# Patient Record
Sex: FEMALE | Race: WHITE | NOT HISPANIC OR LATINO | Employment: FULL TIME | URBAN - METROPOLITAN AREA
[De-identification: names, ages, dates, MRNs, and addresses within clinical notes are randomized per-mention and may not be internally consistent; named-entity substitution may affect disease eponyms.]

---

## 2020-12-28 ENCOUNTER — IMMUNIZATIONS (OUTPATIENT)
Dept: FAMILY MEDICINE CLINIC | Facility: HOSPITAL | Age: 45
End: 2020-12-28
Payer: COMMERCIAL

## 2020-12-28 DIAGNOSIS — Z23 ENCOUNTER FOR IMMUNIZATION: ICD-10-CM

## 2020-12-28 PROCEDURE — 0011A SARS-COV-2 / COVID-19 MRNA VACCINE (MODERNA) 100 MCG: CPT

## 2020-12-28 PROCEDURE — 91301 SARS-COV-2 / COVID-19 MRNA VACCINE (MODERNA) 100 MCG: CPT

## 2021-01-26 ENCOUNTER — IMMUNIZATIONS (OUTPATIENT)
Dept: FAMILY MEDICINE CLINIC | Facility: HOSPITAL | Age: 46
End: 2021-01-26

## 2021-01-26 DIAGNOSIS — Z23 ENCOUNTER FOR IMMUNIZATION: Primary | ICD-10-CM

## 2021-01-26 PROCEDURE — 91301 SARS-COV-2 / COVID-19 MRNA VACCINE (MODERNA) 100 MCG: CPT

## 2021-01-26 PROCEDURE — 0012A SARS-COV-2 / COVID-19 MRNA VACCINE (MODERNA) 100 MCG: CPT

## 2021-09-21 ENCOUNTER — APPOINTMENT (OUTPATIENT)
Dept: URGENT CARE | Facility: CLINIC | Age: 46
End: 2021-09-21

## 2021-09-21 DIAGNOSIS — Z02.1 PHYSICAL EXAM, PRE-EMPLOYMENT: Primary | ICD-10-CM

## 2021-09-21 LAB — RUBV IGG SERPL IA-ACNC: >175 IU/ML

## 2021-09-21 PROCEDURE — 86480 TB TEST CELL IMMUN MEASURE: CPT

## 2021-09-21 PROCEDURE — 86762 RUBELLA ANTIBODY: CPT

## 2021-09-21 PROCEDURE — 86735 MUMPS ANTIBODY: CPT

## 2021-09-21 PROCEDURE — 86765 RUBEOLA ANTIBODY: CPT

## 2021-09-21 PROCEDURE — 86787 VARICELLA-ZOSTER ANTIBODY: CPT

## 2021-09-23 LAB
GAMMA INTERFERON BACKGROUND BLD IA-ACNC: 0.03 IU/ML
M TB IFN-G BLD-IMP: NEGATIVE
M TB IFN-G CD4+ BCKGRND COR BLD-ACNC: 0 IU/ML
M TB IFN-G CD4+ BCKGRND COR BLD-ACNC: 0 IU/ML
MEV IGG SER QL: NORMAL
MITOGEN IGNF BCKGRD COR BLD-ACNC: >10 IU/ML
MUV IGG SER QL: NORMAL
VZV IGG SER IA-ACNC: NORMAL

## 2023-03-12 ENCOUNTER — OFFICE VISIT (OUTPATIENT)
Dept: URGENT CARE | Facility: CLINIC | Age: 48
End: 2023-03-12

## 2023-03-12 VITALS
HEART RATE: 75 BPM | TEMPERATURE: 97.3 F | SYSTOLIC BLOOD PRESSURE: 117 MMHG | OXYGEN SATURATION: 98 % | DIASTOLIC BLOOD PRESSURE: 67 MMHG

## 2023-03-12 DIAGNOSIS — J02.9 SORE THROAT: ICD-10-CM

## 2023-03-12 DIAGNOSIS — J02.0 STREP PHARYNGITIS: Primary | ICD-10-CM

## 2023-03-12 LAB — S PYO AG THROAT QL: POSITIVE

## 2023-03-12 RX ORDER — ESOMEPRAZOLE MAGNESIUM 40 MG/1
40 CAPSULE, DELAYED RELEASE ORAL
COMMUNITY

## 2023-03-12 RX ORDER — AMOXICILLIN 500 MG/1
500 CAPSULE ORAL EVERY 12 HOURS SCHEDULED
Qty: 20 CAPSULE | Refills: 0 | Status: SHIPPED | OUTPATIENT
Start: 2023-03-12 | End: 2023-03-22

## 2023-03-12 RX ORDER — FEXOFENADINE HCL 180 MG/1
TABLET ORAL
COMMUNITY

## 2023-03-12 RX ORDER — HYDROCHLOROTHIAZIDE 25 MG/1
TABLET ORAL
COMMUNITY
Start: 2023-03-02

## 2023-03-12 RX ORDER — ATORVASTATIN CALCIUM 20 MG/1
TABLET, FILM COATED ORAL
COMMUNITY
Start: 2023-03-02

## 2023-03-12 RX ORDER — ASPIRIN 81 MG/1
TABLET ORAL
COMMUNITY

## 2023-03-12 RX ORDER — MULTIVITAMIN
TABLET ORAL
COMMUNITY

## 2023-03-12 RX ORDER — LISINOPRIL 10 MG/1
TABLET ORAL
COMMUNITY
Start: 2022-12-05

## 2023-03-12 NOTE — PROGRESS NOTES
[facility]        NAME: Home Haynes is a 50 y o  female  : 1975    MRN: [id number]  DATE: 2023  TIME: 7:26 PM    Assessment and Plan   Strep pharyngitis [J02 0]  1  Strep pharyngitis  amoxicillin (AMOXIL) 500 mg capsule      2  Sore throat  POCT rapid strepA            Patient Instructions       Follow up with PCP in 3-5 days  Proceed to  ER if symptoms worsen  Chief Complaint     Chief Complaint   Patient presents with   • Sore Throat     Pt reports more than a week of having sore throat with white spots in the back of throat as well  Pt also reports right tonsil swelling  History of Present Illness       47-year-old female presenting with 5-day history of sore throat and painful swallowing  Review of Systems   Review of Systems   Constitutional: Negative  HENT: Positive for sore throat  Eyes: Negative  Respiratory: Negative  Cardiovascular: Negative  Gastrointestinal: Negative  Genitourinary: Negative  Musculoskeletal: Positive for arthralgias and myalgias  Skin: Negative  Allergic/Immunologic: Negative  Neurological: Negative  Hematological: Negative  Psychiatric/Behavioral: Negative            Current Medications       Current Outpatient Medications:   •  amoxicillin (AMOXIL) 500 mg capsule, Take 1 capsule (500 mg total) by mouth every 12 (twelve) hours for 10 days, Disp: 20 capsule, Rfl: 0  •  aspirin (ECOTRIN LOW STRENGTH) 81 mg EC tablet, Take by mouth, Disp: , Rfl:   •  atorvastatin (LIPITOR) 20 mg tablet, Take by mouth, Disp: , Rfl:   •  Empagliflozin (JARDIANCE) 10 MG TABS tablet, Take by mouth, Disp: , Rfl:   •  esomeprazole (NexIUM) 40 MG capsule, Take 40 mg by mouth, Disp: , Rfl:   •  fexofenadine (ALLEGRA) 180 MG tablet, Take by mouth, Disp: , Rfl:   •  hydrochlorothiazide (HYDRODIURIL) 25 mg tablet, Take by mouth, Disp: , Rfl:   •  lisinopril (ZESTRIL) 10 mg tablet, Take by mouth, Disp: , Rfl:   •  metFORMIN (GLUCOPHAGE) 1000 MG tablet, Take by mouth, Disp: , Rfl:   •  Multiple Vitamin tablet, Take by mouth, Disp: , Rfl:   •  Turmeric 1053 MG TABS, Take by mouth, Disp: , Rfl:     Current Allergies     Allergies as of 03/12/2023 - Reviewed 03/12/2023   Allergen Reaction Noted   • [place] (diagnostic) - food allergy Hives 03/12/2023   • Codeine Vomiting 02/19/2020   • Morphine Other (See Comments) and Vomiting 05/18/2022   • Oxycodone-acetaminophen Vomiting 02/19/2020            The following portions of the patient's history were reviewed and updated as appropriate: allergies, current medications, past family history, past medical history, past social history, past surgical history and problem list      No past medical history on file  No past surgical history on file  No family history on file  Medications have been verified  Objective   /67 (BP Location: Right arm, Patient Position: Sitting, Cuff Size: Large)   Pulse 75   Temp (!) 97 3 °F (36 3 °C) (Tympanic)   SpO2 98%   No LMP recorded  Physical Exam     Physical Exam  Vitals and nursing note reviewed  Constitutional:       Appearance: She is well-developed  HENT:      Head: Normocephalic  Mouth/Throat:      Pharynx: Pharyngeal swelling, oropharyngeal exudate and posterior oropharyngeal erythema present  Tonsils: Tonsillar exudate present  Eyes:      Pupils: Pupils are equal, round, and reactive to light  Cardiovascular:      Rate and Rhythm: Normal rate and regular rhythm  Pulmonary:      Effort: Pulmonary effort is normal    Musculoskeletal:         General: Normal range of motion  Skin:     General: Skin is warm and dry  Neurological:      Mental Status: She is alert and oriented to person, place, and time

## 2023-05-08 ENCOUNTER — OFFICE VISIT (OUTPATIENT)
Dept: GYNECOLOGY | Facility: CLINIC | Age: 48
End: 2023-05-08

## 2023-05-08 VITALS
DIASTOLIC BLOOD PRESSURE: 70 MMHG | WEIGHT: 293 LBS | SYSTOLIC BLOOD PRESSURE: 118 MMHG | BODY MASS INDEX: 50.02 KG/M2 | HEIGHT: 64 IN

## 2023-05-08 DIAGNOSIS — Z11.3 SCREEN FOR STD (SEXUALLY TRANSMITTED DISEASE): Primary | ICD-10-CM

## 2023-05-08 DIAGNOSIS — Z11.51 SCREENING FOR HUMAN PAPILLOMAVIRUS (HPV): ICD-10-CM

## 2023-05-08 DIAGNOSIS — Z12.31 ENCOUNTER FOR SCREENING MAMMOGRAM FOR BREAST CANCER: ICD-10-CM

## 2023-05-08 DIAGNOSIS — Z01.419 ENCOUNTER FOR ANNUAL ROUTINE GYNECOLOGICAL EXAMINATION: ICD-10-CM

## 2023-05-08 RX ORDER — VIT C/B6/B5/MAGNESIUM/HERB 173 50-5-6-5MG
CAPSULE ORAL
COMMUNITY

## 2023-05-08 NOTE — PROGRESS NOTES
Assessment/Plan:    pap and HPV done today    GC and chlamydia done as she has never had this before    mammogram reviewed with her including breast density  RX given and she will schedule  Discussed self breast exams    colon cancer screening-negative Cologuard in 2022    Midcycle spotting-this may be from ovulation  She will schedule sonohysterogram to rule out a polyp  She will keep track of her cycles  discussed preventive care, regular exercise and a healthy diet      No problem-specific Assessment & Plan notes found for this encounter  Diagnoses and all orders for this visit:    Screen for STD (sexually transmitted disease)  -     Chlamydia/GC amplified DNA by PCR    Encounter for annual routine gynecological examination  -     Liquid-based pap, screening    Encounter for screening mammogram for breast cancer  -     Mammo screening bilateral w 3d & cad; Future    Screening for human papillomavirus (HPV)  -     Liquid-based pap, screening    Other orders  -     Multiple Vitamin (MULTIVITAMIN ADULT PO); Take by mouth  -     Turmeric 500 MG CAPS; Take by mouth          Subjective:      Patient ID: Diya Kramer is a 50 y o  female  New Pt - 50year old female presents for yearly  She has not had a GYN exam in many years  Overall, her cycles have been regular but last month she skipped her cycle for the first time  She has spotting for 1 or 2 days that occurs 1 week after her cycle ends  This has been occurring for the last year  It seems to be happening midcycle  She has some mild cramps although these are unchanged  She has been having hot flashes both during the day and at night for about a year  She has never had a mammogram     She did have a normal Pap in   She does not require contraception as she is in a same sex marriage      Mother had leukemia in her 76s, father  at 43 had blood clot/MI  She has never been pregnant          The following portions of the patient's history were reviewed and updated as appropriate: allergies, current medications, past family history, past medical history, past social history, past surgical history and problem list     Review of Systems   Constitutional: Negative  Gastrointestinal: Negative  Genitourinary: Negative  Objective: There were no vitals taken for this visit  Physical Exam  Vitals reviewed  Constitutional:       Appearance: She is well-developed  Neck:      Thyroid: No thyromegaly  Trachea: No tracheal deviation  Cardiovascular:      Rate and Rhythm: Normal rate and regular rhythm  Pulmonary:      Effort: Pulmonary effort is normal       Breath sounds: Normal breath sounds  Chest:   Breasts:     Breasts are symmetrical       Right: No inverted nipple, mass, nipple discharge, skin change or tenderness  Left: No inverted nipple, mass, nipple discharge, skin change or tenderness  Abdominal:      General: There is no distension  Palpations: Abdomen is soft  There is no mass  Tenderness: There is no abdominal tenderness  Genitourinary:     Labia:         Right: No rash, tenderness, lesion or injury  Left: No rash, tenderness, lesion or injury  Vagina: Normal       Cervix: No cervical motion tenderness, discharge or friability  Adnexa:         Right: No mass, tenderness or fullness  Left: No mass, tenderness or fullness          Comments: Patient declines rectal  Exam limited by body habitus  Cervix very high

## 2023-05-10 LAB
C TRACH DNA SPEC QL NAA+PROBE: NEGATIVE
HPV HR 12 DNA CVX QL NAA+PROBE: NEGATIVE
HPV16 DNA CVX QL NAA+PROBE: NEGATIVE
HPV18 DNA CVX QL NAA+PROBE: NEGATIVE
N GONORRHOEA DNA SPEC QL NAA+PROBE: NEGATIVE

## 2023-05-11 LAB
LAB AP GYN PRIMARY INTERPRETATION: NORMAL
Lab: NORMAL

## 2023-06-10 ENCOUNTER — HOSPITAL ENCOUNTER (OUTPATIENT)
Dept: MAMMOGRAPHY | Facility: CLINIC | Age: 48
Discharge: HOME | End: 2023-06-10
Payer: COMMERCIAL

## 2023-06-10 VITALS — HEIGHT: 63 IN | BODY MASS INDEX: 51.91 KG/M2 | WEIGHT: 293 LBS

## 2023-06-10 DIAGNOSIS — Z12.31 ENCOUNTER FOR SCREENING MAMMOGRAM FOR BREAST CANCER: ICD-10-CM

## 2023-06-10 DIAGNOSIS — Z12.31 VISIT FOR SCREENING MAMMOGRAM: ICD-10-CM

## 2023-06-10 PROCEDURE — 77067 SCR MAMMO BI INCL CAD: CPT

## 2023-06-10 PROCEDURE — 77063 BREAST TOMOSYNTHESIS BI: CPT

## 2023-07-17 ENCOUNTER — HOSPITAL ENCOUNTER (OUTPATIENT)
Dept: ULTRASOUND IMAGING | Facility: CLINIC | Age: 48
Discharge: HOME | End: 2023-07-17
Payer: COMMERCIAL

## 2023-07-17 DIAGNOSIS — R92.8 ABNORMAL MAMMOGRAM: ICD-10-CM

## 2023-07-17 PROCEDURE — 76642 ULTRASOUND BREAST LIMITED: CPT

## 2024-01-18 ENCOUNTER — HOSPITAL ENCOUNTER (OUTPATIENT)
Dept: ULTRASOUND IMAGING | Facility: CLINIC | Age: 49
Discharge: HOME | End: 2024-01-18
Payer: COMMERCIAL

## 2024-01-18 DIAGNOSIS — R92.8 ABNORMAL FINDINGS ON DIAGNOSTIC IMAGING OF BREAST: ICD-10-CM

## 2024-01-18 PROCEDURE — 76642 ULTRASOUND BREAST LIMITED: CPT

## 2024-08-22 ENCOUNTER — OFFICE VISIT (OUTPATIENT)
Dept: SURGERY | Facility: HOSPITAL | Age: 49
End: 2024-08-22
Payer: COMMERCIAL

## 2024-08-22 VITALS
SYSTOLIC BLOOD PRESSURE: 120 MMHG | BODY MASS INDEX: 48.82 KG/M2 | WEIGHT: 293 LBS | HEART RATE: 105 BPM | DIASTOLIC BLOOD PRESSURE: 76 MMHG | TEMPERATURE: 97.3 F | HEIGHT: 65 IN

## 2024-08-22 DIAGNOSIS — R22.2 MASS OF SKIN OF BACK: Primary | ICD-10-CM

## 2024-08-22 PROCEDURE — 99243 OFF/OP CNSLTJ NEW/EST LOW 30: CPT | Performed by: SURGERY

## 2024-08-26 ENCOUNTER — TELEPHONE (OUTPATIENT)
Age: 49
End: 2024-08-26

## 2024-08-26 NOTE — TELEPHONE ENCOUNTER
Pt of Dr. Richardson with excision biopsy/mass on back sx 9/11-  Spouse calling in to inquire if pt will be NPO.   Called office and confirmed pt will be NPO after midnight.   No other questions at this time. Thankful for the information.

## 2024-09-25 NOTE — PROGRESS NOTES
Assessment/Plan:   Derek Arango is a 49 y.o.female with PMHx of morbid obesity, DM, HTN, HLD, WILIAM, GERD, who is here for f/u of  skin cyst of back.    Sebaceous cyst of Midback    -Present for 30 years, occasionally becomes inflamed and painful with drainage  -History of infection at the site treated with oral antibiotics  -Previously saw Dr. Barrera in the office, procedure rescheduled due to timing issues      PLAN: Will plan for excision of back cyst to be done in the operating room due to size of cyst cavity.  Procedure discussed in detail with the patient as well as possible risks and complications and written consent has been obtained.  All questions answered.    Hemoglobin A1c 6.4, recent labs reviewed without other abnormality.  Patient denies any chest pain or shortness of breath.  Will add a EKG to complete workup.    HPI:  Derek Arango is a 49 y.o.female who was referred for evaluation of evaluation back cyst.  Patient with 30-year history of back cyst.  Area frequently becomes inflamed and painful.  Has been squeezed in the past with some drainage.  She has been treated with oral antibiotics for infection in the past.  No history of I&D procedure.  Currently no pain or drainage, does have some swelling.  States cyst has decreased in size from previous visit.  Does have other area cyst in her pelvic region.  Currently does not bother patient.  Patient is morbidly obese.  She is diabetic.  Denies issues with healing.  She does take a baby aspirin.  No significant surgical history.  No family history of anesthesia complications.    Currently no pain or drainage at site.  No fevers or chills.  Mild tenderness with pressure to site.    No chest pain, cough, shortness of breath, heart palpitations.       ROS:  General ROS: negative  negative for - chills, fatigue, fever or night sweats, weight loss  Respiratory ROS: no cough, shortness of breath, or wheezing  Cardiovascular ROS: no chest pain or  dyspnea on exertion  Genito-Urinary ROS: no dysuria, trouble voiding, or hematuria  Musculoskeletal ROS: negative for - gait disturbance, joint pain or muscle pain  Neurological ROS: no TIA or stroke symptoms  Skin ROS: See HPI    ALLERGIES  Codeine, Morphine, Other, [place] (diagnostic) - food allergy, Strawberry extract - food allergy, Oxycodone-acetaminophen, and Amoxicillin    Current Outpatient Medications:     aspirin (ECOTRIN LOW STRENGTH) 81 mg EC tablet, Take by mouth daily, Disp: , Rfl:     atorvastatin (LIPITOR) 20 mg tablet, Take by mouth every evening, Disp: , Rfl:     CINNAMON PO, Use, Disp: , Rfl:     Cyanocobalamin 1000 MCG CAPS, Take by mouth daily, Disp: , Rfl:     Empagliflozin (JARDIANCE) 10 MG TABS tablet, Take by mouth daily, Disp: , Rfl:     esomeprazole (NexIUM) 40 MG capsule, Take 20 mg by mouth in the morning, Disp: , Rfl:     Ferrous Sulfate (IRON PO), Take by mouth in the morning, Disp: , Rfl:     fexofenadine (ALLEGRA) 180 MG tablet, Take by mouth daily, Disp: , Rfl:     glimepiride (AMARYL) 4 mg tablet, Take 1 tablet (4 mg total) by mouthevery morning 15 min. before breakfast., Disp: , Rfl:     hydrochlorothiazide (HYDRODIURIL) 25 mg tablet, Take by mouth daily, Disp: , Rfl:     lisinopril (ZESTRIL) 10 mg tablet, Take by mouth daily, Disp: , Rfl:     MAGNESIUM PO, Take by mouth in the morning, Disp: , Rfl:     metFORMIN (GLUCOPHAGE) 1000 MG tablet, Take by mouth 2 (two) times a day with meals, Disp: , Rfl:     Multiple Vitamin (MULTIVITAMIN ADULT PO), Take by mouth in the morning, Disp: , Rfl:     Multiple Vitamin tablet, Take by mouth (Patient not taking: Reported on 9/6/2024), Disp: , Rfl:     Turmeric 1053 MG TABS, Take by mouth (Patient not taking: Reported on 9/6/2024), Disp: , Rfl:     Turmeric 500 MG CAPS, Take by mouth in the morning, Disp: , Rfl:   Past Medical History:   Diagnosis Date    CPAP (continuous positive airway pressure) dependence     Diabetes mellitus (HCC)      GERD (gastroesophageal reflux disease)     Hyperlipidemia     Hypertension     Migraine     PONV (postoperative nausea and vomiting)     Sleep apnea      Past Surgical History:   Procedure Laterality Date    CYST REMOVAL       Family History   Problem Relation Age of Onset    Cancer Mother     No Known Problems Father     No Known Problems Maternal Aunt     No Known Problems Maternal Aunt     No Known Problems Paternal Aunt       reports that she has never smoked. She has never used smokeless tobacco. She reports current alcohol use. She reports that she does not currently use drugs.    PHYSICAL EXAM  Vitals:    09/27/24 0957   BP: 130/81   Pulse: 89   Temp: (!) 97 °F (36.1 °C)     Weight (last 2 days)       Date/Time Weight    09/27/24 0957 155 (342.4)            General Appearance:    Alert, cooperative, no distress, morbidly obese   Head:    Normocephalic without obvious abnormality   Eyes:    Conjunctiva/corneas clear, EOM's intact        Neck:   Supple, no adenopathy, no JVD   Back:     Symmetric, no spinal or CVA tenderness   Lungs:     Clear to auscultation bilaterally, no wheezing or rhonchi, distant BS   Heart:    Regular rate and rhythm, S1 and S2 normal, no murmur   Abdomen:     Obese, benign, no rebound or guarding.    Extremities:   Extremities normal. No clubbing, cyanosis or edema   Psych:   Normal Affect, AOx3.    Neurologic:  Skin:   CNII-XII intact. Strength symmetric, speech intact    Warm, dry  Approximate 5 cm raised area in mid back with skin tract consistent with underlying cyst.  Area is nontender with no overlying skin changes or signs of infection.           Irina Fernandez

## 2024-09-26 RX ORDER — SODIUM CHLORIDE, SODIUM LACTATE, POTASSIUM CHLORIDE, CALCIUM CHLORIDE 600; 310; 30; 20 MG/100ML; MG/100ML; MG/100ML; MG/100ML
125 INJECTION, SOLUTION INTRAVENOUS CONTINUOUS
OUTPATIENT
Start: 2024-09-26

## 2024-09-26 NOTE — PROGRESS NOTES
Assessment/Plan:   Derek Arango is a 49 y.o.female who is here for Consult (LARGE CYST MID BACK//PT has had the cyst for about 30 years, it typically was not large. What it has usually been doing it that it swells up and then it reduces in size, but a few months ago it swelled up and stayed that size. It leaks out a white substance that has a smell to it at times. It would have a friend drain it, but this time around it would not drain. )  .    Plan: Back cyst -  discussed operative vs conservative mgt, surgical approaches, risks and benefits and patient understands t wishes to proceed with excision of back cyst    Preoperative Clearance: None    HPI:  Derek Arango is a 49 y.o.female who was referred for evaluation of Consult (LARGE CYST MID BACK//PT has had the cyst for about 30 years, it typically was not large. What it has usually been doing it that it swells up and then it reduces in size, but a few months ago it swelled up and stayed that size. It leaks out a white substance that has a smell to it at times. It would have a friend drain it, but this time around it would not drain. )  .    Currently large back cyst.     ROS:  General ROS: negative  negative for - chills, fatigue, fever or night sweats, weight loss  Respiratory ROS: no cough, shortness of breath, or wheezing  Cardiovascular ROS: no chest pain or dyspnea on exertion  Genito-Urinary ROS: no dysuria, trouble voiding, or hematuria  Musculoskeletal ROS: negative for - gait disturbance, joint pain or muscle pain  Neurological ROS: no TIA or stroke symptoms      [unfilled]  Codeine, Morphine, Other, [place]raw[place] (diagnostic) - food allergy, Strawberry extract - food allergy, Oxycodone-acetaminophen, and Amoxicillin    Current Outpatient Medications:     aspirin (ECOTRIN LOW STRENGTH) 81 mg EC tablet, Take by mouth daily, Disp: , Rfl:     atorvastatin (LIPITOR) 20 mg tablet, Take by mouth every evening, Disp: , Rfl:     Empagliflozin (JARDIANCE)  10 MG TABS tablet, Take by mouth daily, Disp: , Rfl:     esomeprazole (NexIUM) 40 MG capsule, Take 20 mg by mouth in the morning, Disp: , Rfl:     fexofenadine (ALLEGRA) 180 MG tablet, Take by mouth daily, Disp: , Rfl:     hydrochlorothiazide (HYDRODIURIL) 25 mg tablet, Take by mouth daily, Disp: , Rfl:     lisinopril (ZESTRIL) 10 mg tablet, Take by mouth daily, Disp: , Rfl:     metFORMIN (GLUCOPHAGE) 1000 MG tablet, Take by mouth 2 (two) times a day with meals, Disp: , Rfl:     Multiple Vitamin (MULTIVITAMIN ADULT PO), Take by mouth in the morning, Disp: , Rfl:     Multiple Vitamin tablet, Take by mouth (Patient not taking: Reported on 9/6/2024), Disp: , Rfl:     Turmeric 1053 MG TABS, Take by mouth (Patient not taking: Reported on 9/6/2024), Disp: , Rfl:     Turmeric 500 MG CAPS, Take by mouth in the morning, Disp: , Rfl:     C[place]NA[place] PO, Use, Disp: , Rfl:     Cyanocobalamin 1000 MCG CAPS, Take by mouth daily, Disp: , Rfl:     Ferrous Sulfate (IRON PO), Take by mouth in the morning, Disp: , Rfl:     glimepiride (AMARYL) 4 mg tablet, Take 1 tablet (4 mg total) by mouthevery morning 15 min. before breakfast., Disp: , Rfl:     MAGNESIUM PO, Take by mouth in the morning, Disp: , Rfl:   Past Medical History:   Diagnosis Date    CPAP (continuous positive airway pressure) dependence     Diabetes mellitus (HCC)     GERD (gastroesophageal reflux disease)     Hyperlipidemia     Hypertension     Migraine     PONV (postoperative nausea and vomiting)     Sleep apnea      Past Surgical History:   Procedure Laterality Date    CYST REMOVAL       Family History   Problem Relation Age of Onset    Cancer Mother     No Known Problems Father     No Known Problems Maternal Aunt     No Known Problems Maternal Aunt     No Known Problems Paternal Aunt       reports that she has never smoked. She has never used smokeless tobacco. She reports current alcohol use. She reports that she does not currently use drugs.    Labs:   No results found  "for: \"WBC\", \"HGB\", \"PLT\"  Lab Results   Component Value Date    ALT 23 06/05/2024    ALT 28 05/31/2023    ALT 23 05/25/2022    AST 12 06/05/2024    AST 15 05/31/2023    AST 12 05/25/2022     This SmartLink has not been configured with any valid records.       PHYSICAL EXAM  General Appearance:    Alert, cooperative, no distress,    Head:    Normocephalic without obvious abnormality   Eyes:    PERRL, conjunctiva/corneas clear, EOM's intact        Neck:   Supple, no adenopathy, no JVD   Back:     Symmetric, no spinal or CVA tenderness   Lungs:     Clear to auscultation bilaterally, no wheezing or rhonchi   Heart:    Regular rate and rhythm, S1 and S2 normal, no murmur   Abdomen:        Extremities:   Extremities normal. No clubbing, cyanosis or edema   Psych:   Normal Affect, AOx3.    Neurologic:  Skin:   CNII-XII intact. Strength symmetric, speech intact    Warm, dry, intact, large back cyst     Physical Exam              Some portions of this record may have been generated with voice recognition software. There may be translation, syntax,  or grammatical errors. Occasional wrong word or \"sound-a-like\" substitutions may have occurred due to the inherent limitations of the voice recognition software. Read the chart carefully and recognize, using context, where substitutions may have occurred. If you have any questions, please contact the dictating provider for clarification or correction, as needed. This encounter has been coded by a non-certified coder.   "

## 2024-09-27 ENCOUNTER — TELEPHONE (OUTPATIENT)
Age: 49
End: 2024-09-27

## 2024-09-27 ENCOUNTER — OFFICE VISIT (OUTPATIENT)
Dept: SURGERY | Facility: HOSPITAL | Age: 49
End: 2024-09-27
Payer: COMMERCIAL

## 2024-09-27 VITALS
TEMPERATURE: 97 F | DIASTOLIC BLOOD PRESSURE: 81 MMHG | WEIGHT: 293 LBS | HEIGHT: 65 IN | BODY MASS INDEX: 48.82 KG/M2 | HEART RATE: 89 BPM | SYSTOLIC BLOOD PRESSURE: 130 MMHG

## 2024-09-27 DIAGNOSIS — R22.2 MASS OF SKIN OF BACK: Primary | ICD-10-CM

## 2024-09-27 PROCEDURE — 99213 OFFICE O/P EST LOW 20 MIN: CPT | Performed by: PHYSICIAN ASSISTANT

## 2024-09-27 NOTE — TELEPHONE ENCOUNTER
Patient called to cancel scheduled surgery on 10/2 with Dr Barrera, her wife had medical emergency.  She said she will call back next week to reschedule.

## 2024-10-03 ENCOUNTER — OFFICE VISIT (OUTPATIENT)
Dept: ENDOCRINOLOGY | Facility: HOSPITAL | Age: 49
End: 2024-10-03
Payer: COMMERCIAL

## 2024-10-03 VITALS
SYSTOLIC BLOOD PRESSURE: 124 MMHG | HEIGHT: 65 IN | BODY MASS INDEX: 48.82 KG/M2 | HEART RATE: 80 BPM | DIASTOLIC BLOOD PRESSURE: 80 MMHG | WEIGHT: 293 LBS

## 2024-10-03 DIAGNOSIS — E78.2 MIXED HYPERLIPIDEMIA: ICD-10-CM

## 2024-10-03 DIAGNOSIS — E66.813 CLASS 3 SEVERE OBESITY WITHOUT SERIOUS COMORBIDITY WITH BODY MASS INDEX (BMI) OF 50.0 TO 59.9 IN ADULT, UNSPECIFIED OBESITY TYPE (HCC): ICD-10-CM

## 2024-10-03 DIAGNOSIS — I10 PRIMARY HYPERTENSION: ICD-10-CM

## 2024-10-03 DIAGNOSIS — R80.9 MICROALBUMINURIA DUE TO TYPE 2 DIABETES MELLITUS  (HCC): ICD-10-CM

## 2024-10-03 DIAGNOSIS — E66.01 CLASS 3 SEVERE OBESITY WITHOUT SERIOUS COMORBIDITY WITH BODY MASS INDEX (BMI) OF 50.0 TO 59.9 IN ADULT, UNSPECIFIED OBESITY TYPE (HCC): ICD-10-CM

## 2024-10-03 DIAGNOSIS — E11.65 TYPE 2 DIABETES MELLITUS WITH HYPERGLYCEMIA, WITHOUT LONG-TERM CURRENT USE OF INSULIN (HCC): Primary | ICD-10-CM

## 2024-10-03 DIAGNOSIS — E11.29 MICROALBUMINURIA DUE TO TYPE 2 DIABETES MELLITUS  (HCC): ICD-10-CM

## 2024-10-03 PROCEDURE — 99244 OFF/OP CNSLTJ NEW/EST MOD 40: CPT | Performed by: INTERNAL MEDICINE

## 2024-10-03 RX ORDER — ATORVASTATIN CALCIUM 20 MG/1
20 TABLET, FILM COATED ORAL EVERY EVENING
Qty: 90 TABLET | Refills: 1 | Status: SHIPPED | OUTPATIENT
Start: 2024-10-03

## 2024-10-03 RX ORDER — GLIMEPIRIDE 4 MG/1
4 TABLET ORAL
Qty: 90 TABLET | Refills: 1 | Status: SHIPPED | OUTPATIENT
Start: 2024-10-03

## 2024-10-03 RX ORDER — LISINOPRIL 10 MG/1
10 TABLET ORAL DAILY
Qty: 90 TABLET | Refills: 1 | Status: SHIPPED | OUTPATIENT
Start: 2024-10-03

## 2024-10-03 RX ORDER — HYDROCHLOROTHIAZIDE 25 MG/1
25 TABLET ORAL DAILY
Qty: 90 TABLET | Refills: 1 | Status: SHIPPED | OUTPATIENT
Start: 2024-10-03

## 2024-10-03 NOTE — PROGRESS NOTES
10/4/2024    Assessment & Plan      Diagnoses and all orders for this visit:    Type 2 diabetes mellitus with hyperglycemia, without long-term current use of insulin (HCC)  -     Comprehensive metabolic panel  -     Hemoglobin A1C  -     tirzepatide (Mounjaro) 2.5 MG/0.5ML; Inject 0.5 mL (2.5 mg total) under the skin every 7 days  -     tirzepatide (Mounjaro) 5 MG/0.5ML; Inject 0.5 mL (5 mg total) under the skin every 7 days  -     Comprehensive metabolic panel; Future  -     Hemoglobin A1C; Future  -     Lipid Panel with Direct LDL reflex; Future  -     Comprehensive metabolic panel  -     Lipid Panel with Direct LDL reflex  -     Empagliflozin (JARDIANCE) 10 MG TABS tablet; Take 1 tablet (10 mg total) by mouth daily  -     metFORMIN (GLUCOPHAGE) 1000 MG tablet; Take 1 tablet (1,000 mg total) by mouth 2 (two) times a day with meals  -     glimepiride (AMARYL) 4 mg tablet; Take 1 tablet (4 mg total) by mouth daily with breakfast    Primary hypertension  -     Comprehensive metabolic panel  -     Hemoglobin A1C  -     Comprehensive metabolic panel; Future  -     Hemoglobin A1C; Future  -     Lipid Panel with Direct LDL reflex; Future  -     Comprehensive metabolic panel  -     Lipid Panel with Direct LDL reflex  -     hydroCHLOROthiazide 25 mg tablet; Take 1 tablet (25 mg total) by mouth daily  -     lisinopril (ZESTRIL) 10 mg tablet; Take 1 tablet (10 mg total) by mouth daily    Mixed hyperlipidemia  -     Comprehensive metabolic panel  -     Hemoglobin A1C  -     Comprehensive metabolic panel; Future  -     Hemoglobin A1C; Future  -     Lipid Panel with Direct LDL reflex; Future  -     Comprehensive metabolic panel  -     Lipid Panel with Direct LDL reflex  -     atorvastatin (LIPITOR) 20 mg tablet; Take 1 tablet (20 mg total) by mouth every evening    Class 3 severe obesity without serious comorbidity with body mass index (BMI) of 50.0 to 59.9 in adult, unspecified obesity type (HCC)  -     Comprehensive metabolic  panel  -     Hemoglobin A1C  -     Comprehensive metabolic panel; Future  -     Hemoglobin A1C; Future  -     Lipid Panel with Direct LDL reflex; Future  -     Comprehensive metabolic panel  -     Lipid Panel with Direct LDL reflex    Microalbuminuria due to type 2 diabetes mellitus  (HCC)          Assessment & Plan  1. Type 2 Diabetes Mellitus.  Most recent hemoglobin A1c back in June 2024 was quite good at 6.4%. She has not had a recent level done, so a hemoglobin A1c and CMP will be checked now. She will continue the same metformin 1000 mg twice a day, Jardiance 10 mg daily, and glimepiride 4 mg in the morning. However, to help with weight loss, glimepiride will be discontinued. Mounjaro 2.5 mg once a week for 4 weeks and then 5 mg once a week thereafter will be started for diabetes management, with the hope of preventing further weight gain and better controlling blood sugars. She will continue to test her blood sugars once daily and try to vary the times before meals and at bedtime. A lower carbohydrate, diabetic diet and possibly eating a protein snack at night to help lower morning blood sugars are recommended. Regular exercise aiming for 150 minutes a week is advised. A referral to diabetes education was offered, but she is not interested at this point.    2. Hypertension.  She is normotensive in the office. She will continue her current dose of hydrochlorothiazide 25 mg daily and lisinopril 10 mg daily.    3. Hyperlipidemia.  Most recent lipid profile is excellent. She does have some minor elevation in triglycerides, which will be followed over time. She will continue the current dose of atorvastatin 20 mg daily.    4. Obesity.  She will continue to work on a portion-controlled diabetic diet with lower carbs and higher protein and start exercising more regularly. Switching from glimepiride to a more weight-neutral medication for diabetes should help with weight loss efforts.    Follow-up  Return in 3 months  for follow up. She will also get a hemoglobin A1c and CMP performed now at her earliest convenience.        CC: Diabetes Consult    History of Present Illness    HPI: Derek Arango is a 49-year-old female here for evaluation and consultation regarding her diabetes. She is accompanied by her wife.    She has been managing her blood sugar levels under the guidance of her primary care physician for the past 4 to 5 years, although she suspects her diabetes may have been present for a longer period. Her treatment regimen includes immediate release metformin (1000 mg twice daily), Jardiance (10 mg daily), and glimepiride (4 mg in the morning). She also tried Rybelsus but found it ineffective and difficult to obtain. Despite her medication, her morning blood sugar levels were consistently around 200, switch from Rybelsus to glimepiride and her treatment plan.     She reports frequent urination, constant hunger, and intermittent thirst, which she attributes to her high coffee intake. She also experiences occasional blurry vision when exposed to bright light, which lasts for about 30 minutes. She has not attended any diabetes education classes but is aware of the importance of a diabetic diet. She has gained approximately 20 pounds since starting glimepiride.    She does not experience chest pain or shortness of breath, except when exerting herself by running up stairs. She also reports daily headaches, which she attributes to work-related stress. She does not experience lightheadedness or dizziness upon standing. She has limited opportunities for exercise due to a previous knee injury and recurring ankle tendinitis, which flares up annually.    She is currently taking atorvastatin (20 mg daily) for cholesterol management.    She is on a daily regimen of hydrochlorothiazide (25 mg) and lisinopril (10 mg) for blood pressure control.    She has an ingrown toenail right now and needs to make an appointment for it. She sees  a podiatrist only when she gets an ingrown toenail. She saw an eye doctor in 05/2024, and there was no diabetic retinopathy.    FAMILY HISTORY  Her mother was diabetic her whole life. Every single one of her mother's siblings and most of her cousins and her grandmother had diabetes. On her father's side, a random cousin here and there had diabetes.    Blood Sugar/Glucometer/Pump/CGM review: Blood sugars are tested once daily in the morning and typically are in the upper 100-200 range.  She denies any hypoglycemia.      Historical Information   Past Medical History:   Diagnosis Date    CPAP (continuous positive airway pressure) dependence     Diabetes mellitus (HCC)     GERD (gastroesophageal reflux disease)     Hyperlipidemia     Hypertension     Migraine     PONV (postoperative nausea and vomiting)     Sleep apnea      Past Surgical History:   Procedure Laterality Date    CYST REMOVAL       Social History   Social History     Substance and Sexual Activity   Alcohol Use Yes    Comment: socially a few time a month     Social History     Substance and Sexual Activity   Drug Use Not Currently     Social History     Tobacco Use   Smoking Status Never   Smokeless Tobacco Never     Family History:   Family History   Problem Relation Age of Onset    Diabetes type II Mother     Cancer Mother     Deep vein thrombosis Father         multiple    Diabetes type II Maternal Aunt     Diabetes type II Maternal Aunt     Diabetes type II Maternal Uncle     Diabetes type II Maternal Uncle     No Known Problems Paternal Aunt     Diabetes type II Maternal Grandmother     Diabetes type II Maternal Grandfather        Meds/Allergies   Current Outpatient Medications   Medication Sig Dispense Refill    aspirin (ECOTRIN LOW STRENGTH) 81 mg EC tablet Take by mouth daily      atorvastatin (LIPITOR) 20 mg tablet Take 1 tablet (20 mg total) by mouth every evening 90 tablet 1    CINNAMON PO Use      Cyanocobalamin 1000 MCG CAPS Take by mouth daily  "     Empagliflozin (JARDIANCE) 10 MG TABS tablet Take 1 tablet (10 mg total) by mouth daily 90 tablet 3    esomeprazole (NexIUM) 40 MG capsule Take 20 mg by mouth in the morning      Ferrous Sulfate (IRON PO) Take by mouth in the morning      fexofenadine (ALLEGRA) 180 MG tablet Take by mouth daily      glimepiride (AMARYL) 4 mg tablet Take 1 tablet (4 mg total) by mouth daily with breakfast 90 tablet 1    hydroCHLOROthiazide 25 mg tablet Take 1 tablet (25 mg total) by mouth daily 90 tablet 1    lisinopril (ZESTRIL) 10 mg tablet Take 1 tablet (10 mg total) by mouth daily 90 tablet 1    MAGNESIUM PO Take by mouth in the morning      metFORMIN (GLUCOPHAGE) 1000 MG tablet Take 1 tablet (1,000 mg total) by mouth 2 (two) times a day with meals 180 tablet 3    Multiple Vitamin (MULTIVITAMIN ADULT PO) Take by mouth in the morning      tirzepatide (Mounjaro) 2.5 MG/0.5ML Inject 0.5 mL (2.5 mg total) under the skin every 7 days 2 mL 0    tirzepatide (Mounjaro) 5 MG/0.5ML Inject 0.5 mL (5 mg total) under the skin every 7 days 6 mL 1    Turmeric 500 MG CAPS Take by mouth in the morning       No current facility-administered medications for this visit.     Allergies   Allergen Reactions    Codeine Vomiting    Morphine Other (See Comments), Vomiting and GI Intolerance    Other Hives and Nausea Only     Sensitive to all  Narcotics per pt    [place] (Diagnostic) - Food Allergy Hives    Strawberry Extract - Food Allergy Hives    Oxycodone-Acetaminophen Vomiting    Amoxicillin Rash       Objective   Vitals: Blood pressure 124/80, pulse 80, height 5' 5\" (1.651 m), weight (!) 155 kg (342 lb), last menstrual period 08/22/2024.  Invasive Devices       None                   Physical Exam    Thyroid is normal in size with no palpable thyroid nodules.  Lungs are clear to auscultation.  Heart exhibits a regular rate and rhythm. No murmurs detected.  No tremor observed in the outstretched hands. Patellar deep tendon reflexes are normal. " No ulcerations noted in the feet. No lower extremity edema present. Dorsalis pedis and posterior tibialis pulses are 2+. Vibration sensation is slightly diminished to the first toe DIP joint bilaterally. A callus is present on the medial portion of the left first toe. Monofilament sensation is intact to both feet except over the heels where there is thick skin.  Patient's shoes and socks removed.    Right Foot/Ankle   Right Foot Inspection  Skin Exam: skin normal and skin intact. No dry skin, no warmth, no callus, no erythema, no maceration, no abnormal color, no pre-ulcer, no ulcer and no callus.     Toe Exam: No swelling and  no right toe deformity    Sensory   Vibration: diminished  Monofilament testing: intact    Vascular  Capillary refills: < 3 seconds  The right DP pulse is 2+. The right PT pulse is 2+.     Left Foot/Ankle  Left Foot Inspection  Skin Exam: skin normal, skin intact and callus. No dry skin, no warmth, no erythema, no maceration, normal color, no pre-ulcer and no ulcer.     Toe Exam: No swelling and no left toe deformity.     Sensory   Vibration: diminished  Monofilament testing: intact    Vascular  Capillary refills: < 3 seconds  The left DP pulse is 2+. The left PT pulse is 2+.     Assign Risk Category  No deformity present  Loss of protective sensation  No weak pulses  Risk: 1        The history was obtained from the review of the chart and from the patient.    Lab Results:    Most recent Alc is  Lab Results   Component Value Date    HGBA1C 6.4 (H) 06/05/2024           Blood work performed on 6/5/2024 showed a CBC with a white blood cell count of 12.3 and platelets of 420 but were otherwise normal.    CMP showed glucose of 156 fasting but was otherwise normal.    Total cholesterol 143, triglyceride 191, HDL 48, LDL 69.    TSH is 1.5.    Urine microalbumin to creatinine ratio was 94.    25 hydroxy vitamin D level is 34.    Lab Results   Component Value Date    CREATININE 0.64 06/05/2024     CREATININE 0.66 05/31/2023    CREATININE 0.75 05/25/2022    BUN 24 06/05/2024    K 4.4 06/05/2024     06/05/2024    CO2 24 06/05/2024     GFR, Calculated   Date Value Ref Range Status   06/05/2024 108 > OR = 60 mL/min/1.73m2 Final         Lab Results   Component Value Date    ALT 23 06/05/2024    AST 12 06/05/2024    ALKPHOS 64 06/05/2024       Lab Results   Component Value Date    TSH 1.50 06/05/2024             Future Appointments   Date Time Provider Department Center   10/7/2024  3:30 PM [facility]MO [facility] 3 BE [facility] Mammo [facility]   10/7/2024  4:00 PM BE US RBC 3 BE [facility] US [facility]   2/6/2025  3:20 PM Kirsten Dupont MD [facility]

## 2024-10-03 NOTE — PATIENT INSTRUCTIONS
Let's get blood work done now.     Continue the same metformin, jardiance ne glimepiride for now.     Start mounjaro 2.5 mg injected once a week for 4 weeks and then 5 mg once a week thereafter.     Continue to test blood sugars once daily, try to vary the times, before meals and bedtime.     Work on regular exercise eventually at least 150 min a week.     Work on lower carb diabetic diet. Protein snack at night sometimes helps lower blood sugars in am.     Follow up in 4 months with blood work.

## 2024-10-04 ENCOUNTER — TELEPHONE (OUTPATIENT)
Age: 49
End: 2024-10-04

## 2024-10-04 PROBLEM — E11.29 MICROALBUMINURIA DUE TO TYPE 2 DIABETES MELLITUS  (HCC): Status: ACTIVE | Noted: 2024-10-04

## 2024-10-04 PROBLEM — R80.9 MICROALBUMINURIA DUE TO TYPE 2 DIABETES MELLITUS  (HCC): Status: ACTIVE | Noted: 2024-10-04

## 2024-10-08 ENCOUNTER — TELEPHONE (OUTPATIENT)
Age: 49
End: 2024-10-08

## 2024-10-08 NOTE — TELEPHONE ENCOUNTER
PA for tirzepatide (Mounjaro) 5 MG/0.5ML  SUBMITTED     via    []CMM-KEY:    [x]Surescripts-Case ID # [id number]  []Availity-Auth ID #  NDC #    []Faxed to plan   []Other website    []Phone call Case ID #      Office notes sent, clinical questions answered. Awaiting determination    Turnaround time for your insurance to make a decision on your Prior Authorization can take 7-21 business days.

## 2024-10-09 LAB
ALBUMIN SERPL-MCNC: 4.1 G/DL (ref 3.6–5.1)
ALBUMIN/GLOB SERPL: 1.5 (CALC) (ref 1–2.5)
ALP SERPL-CCNC: 60 U/L (ref 37–153)
ALT SERPL-CCNC: 31 U/L (ref 6–29)
AST SERPL-CCNC: 12 U/L (ref 10–35)
BILIRUB SERPL-MCNC: 0.4 MG/DL (ref 0.2–1.2)
BUN SERPL-MCNC: 20 MG/DL (ref 7–25)
BUN/CREAT SERPL: ABNORMAL (CALC) (ref 6–22)
CALCIUM SERPL-MCNC: 9.9 MG/DL (ref 8.6–10.4)
CHLORIDE SERPL-SCNC: 104 MMOL/L (ref 98–110)
CHOLEST SERPL-MCNC: 138 MG/DL
CHOLEST/HDLC SERPL: 2.7 (CALC)
CO2 SERPL-SCNC: 23 MMOL/L (ref 20–32)
CREAT SERPL-MCNC: 0.67 MG/DL (ref 0.5–1.03)
GFR/BSA.PRED SERPLBLD CYS-BASED-ARV: 106 ML/MIN/1.73M2
GLOBULIN SER CALC-MCNC: 2.7 G/DL (CALC) (ref 1.9–3.7)
GLUCOSE SERPL-MCNC: 136 MG/DL (ref 65–99)
HBA1C MFR BLD: 6.6 % OF TOTAL HGB
HDLC SERPL-MCNC: 51 MG/DL
LDLC SERPL CALC-MCNC: 67 MG/DL (CALC)
NONHDLC SERPL-MCNC: 87 MG/DL (CALC)
POTASSIUM SERPL-SCNC: 4.4 MMOL/L (ref 3.5–5.3)
PROT SERPL-MCNC: 6.8 G/DL (ref 6.1–8.1)
SODIUM SERPL-SCNC: 135 MMOL/L (ref 135–146)
TRIGL SERPL-MCNC: 123 MG/DL

## 2024-10-10 NOTE — TELEPHONE ENCOUNTER
PA for   tirzepatide (Mounjaro) 5 MG/0.5ML  APPROVED     Date(s) approved October 8, 2024 to October 8, 2025     Case #[id number]     Patient advised by          []MyChart Message  []Phone call   [x]LMOM  []L/M to call office as no active Communication consent on file  []Unable to leave detailed message as VM not approved on Communication consent       Pharmacy advised by    [x]Fax  []Phone call    Approval letter scanned into Media Yes

## 2025-01-29 LAB
ALBUMIN SERPL-MCNC: 4.4 G/DL (ref 3.6–5.1)
ALBUMIN/GLOB SERPL: 1.5 (CALC) (ref 1–2.5)
ALP SERPL-CCNC: 67 U/L (ref 37–153)
ALT SERPL-CCNC: 20 U/L (ref 6–29)
AST SERPL-CCNC: 12 U/L (ref 10–35)
BILIRUB SERPL-MCNC: 0.3 MG/DL (ref 0.2–1.2)
BUN SERPL-MCNC: 22 MG/DL (ref 7–25)
BUN/CREAT SERPL: ABNORMAL (CALC) (ref 6–22)
CALCIUM SERPL-MCNC: 10.5 MG/DL (ref 8.6–10.4)
CHLORIDE SERPL-SCNC: 104 MMOL/L (ref 98–110)
CO2 SERPL-SCNC: 23 MMOL/L (ref 20–32)
CREAT SERPL-MCNC: 0.77 MG/DL (ref 0.5–1.03)
GFR/BSA.PRED SERPLBLD CYS-BASED-ARV: 93 ML/MIN/1.73M2
GLOBULIN SER CALC-MCNC: 2.9 G/DL (CALC) (ref 1.9–3.7)
GLUCOSE SERPL-MCNC: 132 MG/DL (ref 65–99)
POTASSIUM SERPL-SCNC: 4.4 MMOL/L (ref 3.5–5.3)
PROT SERPL-MCNC: 7.3 G/DL (ref 6.1–8.1)
SODIUM SERPL-SCNC: 139 MMOL/L (ref 135–146)

## 2025-01-30 ENCOUNTER — TELEPHONE (OUTPATIENT)
Age: 50
End: 2025-01-30

## 2025-01-30 NOTE — TELEPHONE ENCOUNTER
Pt returned call stating DME as [facility]    Pt last saw Dr. Mauricio of [facility]/ she believes they were to fax her med recs to office.

## 2025-02-05 ENCOUNTER — OFFICE VISIT (OUTPATIENT)
Age: 50
End: 2025-02-05
Payer: COMMERCIAL

## 2025-02-05 VITALS
DIASTOLIC BLOOD PRESSURE: 70 MMHG | HEIGHT: 65 IN | TEMPERATURE: 98.5 F | SYSTOLIC BLOOD PRESSURE: 128 MMHG | BODY MASS INDEX: 48.82 KG/M2 | WEIGHT: 293 LBS | OXYGEN SATURATION: 97 % | HEART RATE: 79 BPM

## 2025-02-05 DIAGNOSIS — G47.33 OSA (OBSTRUCTIVE SLEEP APNEA): Primary | ICD-10-CM

## 2025-02-05 DIAGNOSIS — E66.813 CLASS 3 SEVERE OBESITY WITHOUT SERIOUS COMORBIDITY WITH BODY MASS INDEX (BMI) OF 50.0 TO 59.9 IN ADULT, UNSPECIFIED OBESITY TYPE (HCC): ICD-10-CM

## 2025-02-05 DIAGNOSIS — E66.01 CLASS 3 SEVERE OBESITY WITHOUT SERIOUS COMORBIDITY WITH BODY MASS INDEX (BMI) OF 50.0 TO 59.9 IN ADULT, UNSPECIFIED OBESITY TYPE (HCC): ICD-10-CM

## 2025-02-05 PROCEDURE — 99203 OFFICE O/P NEW LOW 30 MIN: CPT | Performed by: INTERNAL MEDICINE

## 2025-02-05 NOTE — PROGRESS NOTES
Sleep Consultation   Derek Arango 49 y.o. female MRN: [id number]      Reason for consultation:     Requesting physician: Dr. Fuentes    Assessment/Plan  49 y.o. F with PMHx of PTSD, DM type 2, HTN who comes in for management of WILIAM.  1.  WILIAM (AHI - unknown) on CPAP 13 with excellent compliance and good clinical response.  Residual AHI - 0.2  (DME -[facility], machine Resmed Airsense 10).    Currently on Mounjaro      -  Allow for autoPAP capability as she continues her weight loss journey.  Switch to autoPAP 10-15 cc of H2O pressure      -  Supply order placed       -  Follow compliance in 3-4 months.        -  Discussed in depth the results of the compliance.  Answered all questions regarding treatment      -  I also discussed in depth the risk of leaving sleep apnea untreated including hypertension, heart failure, arrhythmia, MI and stroke.    2,  Obesity - she is currently on Mounjaro and has started her weight loss.  Will need to monitor her weight loss.      History of Present Illness   HPI:  Derek Arango is a 49 y.o. female with PMHx as below who comes in for evaluation of WILIAM.  Patient notes She has been on CPAP for several years and feels she can not sleep without the device.  If she uses the CPAP she does not have any issues with daytime sleepiness but if she skips even once she is falling asleep during the day.   She has also recently started mounjaro for her diabetes and has noted weight loss.   She has no issues with tolerance, mask leak or dry mouth.  She does get headaches but they tend to be unrelated to sleep.   she denies symptoms of restless legs.  she denies symptoms of cataplexy, sleep paralysis, hypnopompic or hypnagogic hallucinations.         Sleep History:  she goes to bed at approximately 10 pm, will get to sleep in 1 min, will get out of bed at 6 am.  she will does not get up through the night as long as she uses her CPAP.  She does not typically nap.    Answers submitted by the  patient for this visit:  Pulmonology Questionnaire (Submitted on 2/1/2025)  Chief Complaint: Primary symptoms  Chronicity: chronic  When did you first notice your symptoms?: more than 1 year ago  How often do your symptoms occur?: daily  Since you first noticed this problem, how has it changed?: unchanged  Do you have shortness of breath that occurs with effort or exertion?: Yes  Do you have ear congestion?: No  Do you have heartburn?: Yes  Do you have fatigue?: Yes  Do you have nasal congestion?: No  Do you have shortness of breath when lying flat?: No  Do you have shortness of breath when you wake up?: No  Do you have sweats?: No  Have you experienced weight loss?: No  Which of the following makes your symptoms worse?: nothing  Which of the following makes your symptoms better?: nothing      ROS:   Review of Systems   Constitutional:  Negative for appetite change and fever.   HENT:  Positive for postnasal drip. Negative for ear pain, rhinorrhea, sneezing, sore throat and trouble swallowing.    Eyes: Negative.    Respiratory:  Positive for apnea. Negative for shortness of breath.    Cardiovascular:  Negative for chest pain.   Endocrine: Negative.    Musculoskeletal:  Negative for myalgias.   Neurological:  Negative for headaches.   Hematological: Negative.    Psychiatric/Behavioral: Negative.             Historical Information   Past Medical History:   Diagnosis Date    CPAP (continuous positive airway pressure) dependence     Diabetes mellitus (HCC)     GERD (gastroesophageal reflux disease)     Hyperlipidemia     Hypertension     Migraine     PONV (postoperative nausea and vomiting)     Sleep apnea      Past Surgical History:   Procedure Laterality Date    CYST REMOVAL       Family History   Problem Relation Age of Onset    Diabetes type II Mother     Cancer Mother     Deep vein thrombosis Father         multiple    Diabetes type II Maternal Aunt     Diabetes type II Maternal Aunt     Diabetes type II Maternal  Uncle     Diabetes type II Maternal Uncle     No Known Problems Paternal Aunt     Diabetes type II Maternal Grandmother     Diabetes type II Maternal Grandfather      Social History     Socioeconomic History    Marital status: /Civil Union     Spouse name: Not on file    Number of children: Not on file    Years of education: Not on file    Highest education level: Not on file   Occupational History    Not on file   Tobacco Use    Smoking status: Never    Smokeless tobacco: Never   Vaping Use    Vaping status: Never Used   Substance and Sexual Activity    Alcohol use: Yes     Comment: socially a few time a month    Drug use: Not Currently    Sexual activity: Yes     Partners: Female   Other Topics Concern    Not on file   Social History Narrative    Not on file     Social Drivers of Health     Financial Resource Strain: Not on file   Food Insecurity: Not on file   Transportation Needs: Not on file   Physical Activity: Inactive (9/27/2023)    [facility] [employer] [employer]    Exercise Vital Sign     Days of Exercise per Week: 0 days     Minutes of Exercise per Session: 0 min   Stress: Not on file   Social Connections: Not on file   Intimate Partner Violence: Not on file   Housing Stability: Not on file       Occupational History: Licensed professional counseller    Meds/Allergies   Allergies   Allergen Reactions    Codeine Vomiting    Morphine Other (See Comments), Vomiting and GI Intolerance    Other Hives and Nausea Only     Sensitive to all  Narcotics per pt    [place] (Diagnostic) - Food Allergy Hives    Strawberry Extract - Food Allergy Hives    Oxycodone-Acetaminophen Vomiting    Amoxicillin Rash       Home medications:  Prior to Admission medications    Medication Sig Start Date End Date Taking? Authorizing Provider   aspirin (ECOTRIN LOW STRENGTH) 81 mg EC tablet Take by mouth daily   Yes Historical Provider, MD   atorvastatin (LIPITOR) 20 mg tablet Take 1 tablet (20 mg total) by mouth  "every evening 10/3/24  Yes Kirsten Dupont MD   CINNAMON PO Use   Yes Historical Provider, MD   Cyanocobalamin 1000 MCG CAPS Take by mouth daily   Yes Historical Provider, MD   Empagliflozin (JARDIANCE) 10 MG TABS tablet Take 1 tablet (10 mg total) by mouth daily 10/3/24  Yes Kirsten Dupont MD   esomeprazole (NexIUM) 40 MG capsule Take 20 mg by mouth in the morning   Yes Historical Provider, MD   Ferrous Sulfate (IRON PO) Take by mouth in the morning   Yes Historical Provider, MD   fexofenadine (ALLEGRA) 180 MG tablet Take by mouth daily   Yes Historical Provider, MD   glimepiride (AMARYL) 4 mg tablet Take 1 tablet (4 mg total) by mouth daily with breakfast 10/3/24  Yes Kirsten Dupont MD   hydroCHLOROthiazide 25 mg tablet Take 1 tablet (25 mg total) by mouth daily 10/3/24  Yes Kirsten Dupont MD   lisinopril (ZESTRIL) 10 mg tablet Take 1 tablet (10 mg total) by mouth daily 10/3/24  Yes Kirsten Dupont MD   MAGNESIUM PO Take by mouth in the morning   Yes Historical Provider, MD   metFORMIN (GLUCOPHAGE) 1000 MG tablet Take 1 tablet (1,000 mg total) by mouth 2 (two) times a day with meals 10/3/24  Yes Kirsten Dupont MD   Multiple Vitamin (MULTIVITAMIN ADULT PO) Take by mouth in the morning   Yes Historical Provider, MD   tirzepatide (Mounjaro) 2.5 MG/0.5ML Inject 0.5 mL (2.5 mg total) under the skin every 7 days 10/3/24  Yes Kirsten Dupont MD   Turmeric 500 MG CAPS Take by mouth in the morning   Yes Historical Provider, MD   tirzepatide (Mounjaro) 5 MG/0.5ML Inject 0.5 mL (5 mg total) under the skin every 7 days  Patient not taking: Reported on 2/5/2025 10/3/24   Kirsten Dupont MD       Vitals:   Blood pressure 128/70, pulse 79, temperature 98.5 °F (36.9 °C), temperature source Tympanic, height 5' 5\" (1.651 m), weight (!) 153 kg (336 lb 6.4 oz), SpO2 97%., RA, Body mass index is 55.98 kg/m².       Physical Exam  General: Pleasant, Awake alert and oriented x 3, conversant without conversational dyspnea, NAD, normal " "affect  HEENT:  PERRL, Sclera noninjected, nonicteric OU, Nares patent,  no craniofacial abnormalities, Mucous membranes, moist, no oral lesions, normal dentition  NECK: Trachea midline, no accessory muscle use, no stridor, no cervical or supraclavicular adenopathy, JVP not elevated  CARDIAC: Reg, single s1/S2, no m/r/g  PULM: CTA bilaterally no wheezing, rhonchi or rales  ABD: Normoactive bowel sounds, soft nontender, nondistended, no rebound, no rigidity, no guarding  EXT: No cyanosis, no clubbing, no edema, normal capillary refill  NEURO: no focal neurologic deficits, AAOx3, moving all extremities appropriately    Labs: I have personally reviewed pertinent lab results.  No results found for: \"WBC\", \"HGB\", \"HCT\", \"MCV\", \"PLT\"   Lab Results   Component Value Date    CALCIUM 10.5 (H) 01/29/2025    K 4.4 01/29/2025    CO2 23 01/29/2025     01/29/2025    BUN 22 01/29/2025    CREATININE 0.77 01/29/2025       Sleep studies:  Diagn[facility]    Compliance Data:  1/6/25 - 2/4/25                                  Type of CPAP:  13                                   Percent usage: 100%                                   Average time used: 7 hrs 45 minutes                                   Residual AHI: 0.2                                       Cricket Perkins DO  [facility]'s Sleep Physician      "

## 2025-02-06 ENCOUNTER — OFFICE VISIT (OUTPATIENT)
Dept: ENDOCRINOLOGY | Facility: HOSPITAL | Age: 50
End: 2025-02-06
Payer: COMMERCIAL

## 2025-02-06 VITALS
DIASTOLIC BLOOD PRESSURE: 80 MMHG | WEIGHT: 293 LBS | HEART RATE: 84 BPM | SYSTOLIC BLOOD PRESSURE: 128 MMHG | HEIGHT: 65 IN | OXYGEN SATURATION: 96 % | BODY MASS INDEX: 48.82 KG/M2

## 2025-02-06 DIAGNOSIS — R80.9 MICROALBUMINURIA DUE TO TYPE 2 DIABETES MELLITUS  (HCC): ICD-10-CM

## 2025-02-06 DIAGNOSIS — I10 PRIMARY HYPERTENSION: ICD-10-CM

## 2025-02-06 DIAGNOSIS — E11.65 TYPE 2 DIABETES MELLITUS WITH HYPERGLYCEMIA, WITHOUT LONG-TERM CURRENT USE OF INSULIN (HCC): Primary | ICD-10-CM

## 2025-02-06 DIAGNOSIS — E11.29 MICROALBUMINURIA DUE TO TYPE 2 DIABETES MELLITUS  (HCC): ICD-10-CM

## 2025-02-06 DIAGNOSIS — E78.2 MIXED HYPERLIPIDEMIA: ICD-10-CM

## 2025-02-06 LAB — SL AMB POCT HEMOGLOBIN AIC: 5.9 (ref ?–6.5)

## 2025-02-06 PROCEDURE — 83036 HEMOGLOBIN GLYCOSYLATED A1C: CPT | Performed by: INTERNAL MEDICINE

## 2025-02-06 PROCEDURE — 99214 OFFICE O/P EST MOD 30 MIN: CPT | Performed by: INTERNAL MEDICINE

## 2025-02-06 RX ORDER — TIRZEPATIDE 7.5 MG/.5ML
INJECTION, SOLUTION SUBCUTANEOUS
Qty: 6 ML | Refills: 3 | Status: SHIPPED | OUTPATIENT
Start: 2025-02-06

## 2025-02-06 NOTE — PATIENT INSTRUCTIONS
Hgba1c is 5.9%. this is excellent.     Let's try stopping the glimepiride.     Let's try increasing the mounjaro to 7.5 mg once a week.     Continue the same metformin and jardiance.     Continue to work on diet and regular exercise.     Continue to test sugars up to once daily.     Follow up in 4-5 months with blood work.

## 2025-02-06 NOTE — PROGRESS NOTES
2/6/2025    Assessment & Plan      Diagnoses and all orders for this visit:    Type 2 diabetes mellitus with hyperglycemia, without long-term current use of insulin (HCC)  -     POCT hemoglobin A1c  -     Tirzepatide (Mounjaro) 7.5 MG/0.5ML SOAJ; Inject 7.5 mg once a week  -     Comprehensive metabolic panel; Future  -     TSH, 3rd generation; Future  -     Hemoglobin A1c (w/out EAG); Future  -     Microalbumin,Urine; Future  -     Comprehensive metabolic panel  -     TSH, 3rd generation  -     Hemoglobin A1c (w/out EAG)  -     Microalbumin,Urine    Microalbuminuria due to type 2 diabetes mellitus  (HCC)  -     Comprehensive metabolic panel; Future  -     TSH, 3rd generation; Future  -     Hemoglobin A1c (w/out EAG); Future  -     Microalbumin,Urine; Future  -     Comprehensive metabolic panel  -     TSH, 3rd generation  -     Hemoglobin A1c (w/out EAG)  -     Microalbumin,Urine    Primary hypertension  -     Comprehensive metabolic panel; Future  -     TSH, 3rd generation; Future  -     Hemoglobin A1c (w/out EAG); Future  -     Microalbumin,Urine; Future  -     Comprehensive metabolic panel  -     TSH, 3rd generation  -     Hemoglobin A1c (w/out EAG)  -     Microalbumin,Urine    Mixed hyperlipidemia  -     Comprehensive metabolic panel; Future  -     TSH, 3rd generation; Future  -     Hemoglobin A1c (w/out EAG); Future  -     Microalbumin,Urine; Future  -     Comprehensive metabolic panel  -     TSH, 3rd generation  -     Hemoglobin A1c (w/out EAG)  -     Microalbumin,Urine        Assessment/Plan:  Type 2 Diabetes   HbA1c in the office today of 5.9% shows excellent control of blood sugars. Due to increased hunger on glimepiride, will stop glimepiride at this time and will increase Mounjaro to 7.5mg once weekly. Patient will continue Jardiance 10mg daily and Metformin 100mg twice daily. Patient will continue to work on diet and exercise. Patient will see eye doctor in the coming months. No symptoms of polydipsia,  polyuria, or blurry vision. No evidence of neuropathy, retinopathy, or nephropathy. Patient will test sugars up to once daily.     Hypertension   Patient is normotensive in the office and will continue her current doses of lisinopril 10mg daily and hydrochlorothiazide 25mg daily.        Follow up in 4-5 months with blood work      CC: Diabetes Follow up    History of Present Illness     HPI: Derek Arango is a 49 y.o. year old female with type 2 diabetes for 5 years.  She is on oral agents at home and takes Jardiance 10mg, metformin 1000mg twice daily, glimepiride 4mg, and Mounjaro 5mg once weekly. She denies any polyuria, polydipsia, nocturia and blurry vision.  She denies neuropathy, nephropathy, retinopathy, heart attack, and stroke but does admit to none.      Hypoglycemic episodes: Once episode of headache with a blood sugar of 76 around 5pm when first getting home from work.     The patient's last eye exam was in May 2024.  The patient's last foot exam was in 10/3/2024.    Blood Sugar/Glucometer/Pump/CGM review: Patient checks her blood sugars 5/7 days of the week first thing in the morning. Blood sugars range from 120-140 which is an improvement since starting Mounjaro.      Since starting Mounjaro, the patient has had some increased reflux and burping but this is not bothersome. She has decreased appetite after taking Mounjaro on Sunday through Tuesday with return of appetite on Wednesday that she attributes to glimepiride. She wants to get off of glimepiride due to its effects on her appetite.    Patient is continuing to work on her diet and exercise. She has lost approximately 8 lbs since last visit.            Review of Systems   Constitutional:  Negative for fatigue.   HENT:  Negative for trouble swallowing.    Eyes:  Negative for visual disturbance.   Respiratory:  Negative for chest tightness and shortness of breath.    Cardiovascular:  Negative for chest pain.   Gastrointestinal:  Negative for  abdominal pain, constipation, diarrhea and nausea.   Endocrine: Negative for polydipsia and polyuria.   Skin:  Negative for rash.   Psychiatric/Behavioral:  Negative for decreased concentration.        Historical Information   Past Medical History:   Diagnosis Date    CPAP (continuous positive airway pressure) dependence     Diabetes mellitus (HCC)     GERD (gastroesophageal reflux disease)     Hyperlipidemia     Hypertension     Migraine     PONV (postoperative nausea and vomiting)     Sleep apnea      Past Surgical History:   Procedure Laterality Date    CYST REMOVAL       Social History   Social History     Substance and Sexual Activity   Alcohol Use Yes    Alcohol/week: 3.0 standard drinks of alcohol    Types: 1 Glasses of wine, 1 Shots of liquor, 1 Standard drinks or equivalent per week    Comment: socially a few time a month     Social History     Substance and Sexual Activity   Drug Use Never     Social History     Tobacco Use   Smoking Status Never   Smokeless Tobacco Never     Family History:   Family History   Problem Relation Age of Onset    Diabetes type II Mother     Cancer Mother     Deep vein thrombosis Father         multiple    Diabetes type II Maternal Aunt     Diabetes type II Maternal Aunt     Diabetes type II Maternal Uncle     Diabetes type II Maternal Uncle     No Known Problems Paternal Aunt     Diabetes type II Maternal Grandmother     Diabetes type II Maternal Grandfather        Meds/Allergies   Current Outpatient Medications   Medication Sig Dispense Refill    aspirin (ECOTRIN LOW STRENGTH) 81 mg EC tablet Take by mouth daily      atorvastatin (LIPITOR) 20 mg tablet Take 1 tablet (20 mg total) by mouth every evening 90 tablet 1    CINNAMON PO Use      Cyanocobalamin 1000 MCG CAPS Take by mouth daily      Empagliflozin (JARDIANCE) 10 MG TABS tablet Take 1 tablet (10 mg total) by mouth daily 90 tablet 3    esomeprazole (NexIUM) 40 MG capsule Take 20 mg by mouth in the morning      Ferrous  "Sulfate (IRON PO) Take by mouth in the morning      fexofenadine (ALLEGRA) 180 MG tablet Take by mouth daily      hydroCHLOROthiazide 25 mg tablet Take 1 tablet (25 mg total) by mouth daily 90 tablet 1    lisinopril (ZESTRIL) 10 mg tablet Take 1 tablet (10 mg total) by mouth daily 90 tablet 1    MAGNESIUM PO Take by mouth in the morning      metFORMIN (GLUCOPHAGE) 1000 MG tablet Take 1 tablet (1,000 mg total) by mouth 2 (two) times a day with meals 180 tablet 3    Multiple Vitamin (MULTIVITAMIN ADULT PO) Take by mouth in the morning      tirzepatide (Mounjaro) 5 MG/0.5ML Inject 0.5 mL (5 mg total) under the skin every 7 days 6 mL 1    Tirzepatide (Mounjaro) 7.5 MG/0.5ML SOAJ Inject 7.5 mg once a week 6 mL 3    Turmeric 500 MG CAPS Take by mouth in the morning       No current facility-administered medications for this visit.     Allergies   Allergen Reactions    Codeine Vomiting    Morphine Other (See Comments), Vomiting and GI Intolerance    Other Hives and Nausea Only     Sensitive to all  Narcotics per pt    [place] (Diagnostic) - Food Allergy Hives    Strawberry Extract - Food Allergy Hives    Oxycodone-Acetaminophen Vomiting    Amoxicillin Rash       Objective   Vitals: Blood pressure 128/80, pulse 84, height 5' 5\" (1.651 m), weight (!) 152 kg (334 lb), SpO2 96%.  Invasive Devices       None                   Physical Exam  Constitutional:       General: She is not in acute distress.     Appearance: Normal appearance.   HENT:      Head: Normocephalic and atraumatic.      Right Ear: External ear normal.      Left Ear: External ear normal.      Nose: Nose normal.   Eyes:      Extraocular Movements: Extraocular movements intact.   Neck:      Thyroid: No thyroid mass, thyromegaly or thyroid tenderness.      Vascular: No carotid bruit.   Cardiovascular:      Rate and Rhythm: Normal rate and regular rhythm.      Heart sounds: Normal heart sounds.   Pulmonary:      Effort: Pulmonary effort is normal.      Breath " "sounds: Normal breath sounds.   Musculoskeletal:      Cervical back: No tenderness.      Comments: No tremor of outstretched hands   Lymphadenopathy:      Cervical: No cervical adenopathy.   Skin:     General: Skin is warm and dry.   Neurological:      General: No focal deficit present.      Mental Status: She is alert and oriented to person, place, and time.      Deep Tendon Reflexes:      Reflex Scores:       Patellar reflexes are 2+ on the right side and 2+ on the left side.  Psychiatric:         Mood and Affect: Mood normal.         Thought Content: Thought content normal.         The history was obtained from the review of the chart and from the patient.    Lab Results:    Most recent Alc is  Lab Results   Component Value Date    HGBA1C 5.9 02/06/2025           No components found for: \"HA1C\"  No components found for: \"GLU\"    Lab Results   Component Value Date    CREATININE 0.77 01/29/2025    CREATININE 0.67 10/09/2024    CREATININE 0.64 06/05/2024    BUN 22 01/29/2025    K 4.4 01/29/2025     01/29/2025    CO2 23 01/29/2025     GFR, Calculated   Date Value Ref Range Status   06/05/2024 108 > OR = 60 mL/min/1.73m2 Final     eGFR   Date Value Ref Range Status   01/29/2025 93 > OR = 60 mL/min/1.73m2 Final     No components found for: \"MALBCRER\"    Lab Results   Component Value Date    HDL 51 10/09/2024    TRIG 123 10/09/2024       Lab Results   Component Value Date    ALT 20 01/29/2025    AST 12 01/29/2025    ALKPHOS 67 01/29/2025       Lab Results   Component Value Date    TSH 1.50 06/05/2024       Recent Results (from the past 60 weeks)   URINE MICROALBUMIN CREATININE RATIO    Collection Time: 06/05/24  7:54 AM   Result Value Ref Range    CREATININE URINE 132 20 - 275 mg/dL    URINE MICROALBUMIN 12.4 See Note: mg/dL    Microalb/Creat Ratio 94 (H) <30 mg/g creat   TSH    Collection Time: 06/05/24  7:54 AM   Result Value Ref Range    TSH 1.50 mIU/L   HEMOGLOBIN A1C    Collection Time: 06/05/24  7:54 AM "   Result Value Ref Range    Hgb A1c 6.4 (H) <5.7 % of total Hgb   COMPREHENSIVE METABOLIC PANEL    Collection Time: 06/05/24  7:54 AM   Result Value Ref Range    Glucose 156 (H) 65 - 99 mg/dL    BUN 24 7 - 25 mg/dL    Creatinine 0.64 0.50 - 1.03 mg/dL    GFR, Calculated 108 > OR = 60 mL/min/1.73m2    BUN/Creatinine Ratio SEE NOTE: 6 - 22 (calc)    Sodium 139 135 - 146 mmol/L    Potassium 4.4 3.5 - 5.3 mmol/L    Chloride 104 98 - 110 mmol/L    Carbon Dioxide 24 20 - 32 mmol/L    Calcium 10.1 8.6 - 10.4 mg/dL    Protein, Total 6.9 6.1 - 8.1 g/dL    ALBUMIN 4.1 3.6 - 5.1 g/dL    Globulin 2.8 1.9 - 3.7 g/dL (calc)    Albumin/Globulin Ratio 1.5 1.0 - 2.5 (calc)    Total Bilirubin 0.3 0.2 - 1.2 mg/dL    Alkaline Phosphatase 64 37 - 153 U/L    AST 12 10 - 35 U/L    ALANINE AMINOTRANSFERASE 23 6 - 29 U/L   URINE MICROALBUMIN CREATININE RATIO    Collection Time: 08/07/24  9:07 AM   Result Value Ref Range    CREATININE URINE 54 20 - 275 mg/dL    URINE MICROALBUMIN 1.4 See Note: mg/dL    Microalb/Creat Ratio 26 <30 mg/g creat   Comprehensive metabolic panel    Collection Time: 10/09/24  7:13 AM   Result Value Ref Range    Glucose, Random 136 (H) 65 - 99 mg/dL    BUN 20 7 - 25 mg/dL    Creatinine 0.67 0.50 - 1.03 mg/dL    eGFR 106 > OR = 60 mL/min/1.73m2    SL AMB BUN/CREATININE RATIO SEE NOTE: 6 - 22 (calc)    Sodium 135 135 - 146 mmol/L    Potassium 4.4 3.5 - 5.3 mmol/L    Chloride 104 98 - 110 mmol/L    CO2 23 20 - 32 mmol/L    Calcium 9.9 8.6 - 10.4 mg/dL    Protein, Total 6.8 6.1 - 8.1 g/dL    Albumin 4.1 3.6 - 5.1 g/dL    Globulin 2.7 1.9 - 3.7 g/dL (calc)    Albumin/Globulin Ratio 1.5 1.0 - 2.5 (calc)    TOTAL BILIRUBIN 0.4 0.2 - 1.2 mg/dL    Alkaline Phosphatase 60 37 - 153 U/L    AST 12 10 - 35 U/L    ALT 31 (H) 6 - 29 U/L   Lipid Panel with Direct LDL reflex    Collection Time: 10/09/24  7:13 AM   Result Value Ref Range    Total Cholesterol 138 <200 mg/dL    HDL 51 > OR = 50 mg/dL    Triglycerides 123 <150 mg/dL     "LDL Calculated 67 mg/dL (calc)    Chol HDLC Ratio 2.7 <5.0 (calc)    Non-HDL Cholesterol 87 <130 mg/dL (calc)   Hemoglobin A1c (w/out EAG)    Collection Time: 10/09/24  7:13 AM   Result Value Ref Range    Hemoglobin A1C 6.6 (H) <5.7 % of total Hgb   Comprehensive metabolic panel    Collection Time: 01/29/25  7:39 AM   Result Value Ref Range    Glucose, Random 132 (H) 65 - 99 mg/dL    BUN 22 7 - 25 mg/dL    Creatinine 0.77 0.50 - 1.03 mg/dL    eGFR 93 > OR = 60 mL/min/1.73m2    SL AMB BUN/CREATININE RATIO SEE NOTE: 6 - 22 (calc)    Sodium 139 135 - 146 mmol/L    Potassium 4.4 3.5 - 5.3 mmol/L    Chloride 104 98 - 110 mmol/L    CO2 23 20 - 32 mmol/L    Calcium 10.5 (H) 8.6 - 10.4 mg/dL    Protein, Total 7.3 6.1 - 8.1 g/dL    Albumin 4.4 3.6 - 5.1 g/dL    Globulin 2.9 1.9 - 3.7 g/dL (calc)    Albumin/Globulin Ratio 1.5 1.0 - 2.5 (calc)    TOTAL BILIRUBIN 0.3 0.2 - 1.2 mg/dL    Alkaline Phosphatase 67 37 - 153 U/L    AST 12 10 - 35 U/L    ALT 20 6 - 29 U/L   POCT hemoglobin A1c    Collection Time: 02/06/25  3:34 PM   Result Value Ref Range    Hemoglobin A1C 5.9 <=6.5         Future Appointments   Date Time Provider Department Center   5/9/2025 12:40 PM Cricket Guglielmello, DO PULM [facility]   7/15/2025  3:40 PM Kirsten Dupont MD ENDO [facility]       Portions of the record may have been created with voice recognition software. Occasional wrong word or \"sound a like\" substitutions may have occurred due to the inherent limitations of voice recognition software. Read the chart carefully and recognize, using context, where substitutions have occurred.   "

## 2025-02-07 LAB

## 2025-02-11 LAB
DME PARACHUTE DELIVERY DATE REQUESTED: NORMAL
DME PARACHUTE ITEM DESCRIPTION: NORMAL
DME PARACHUTE ORDER STATUS: NORMAL
DME PARACHUTE SUPPLIER NAME: NORMAL
DME PARACHUTE SUPPLIER PHONE: NORMAL

## 2025-02-16 ENCOUNTER — TELEMEDICINE (OUTPATIENT)
Dept: OTHER | Facility: HOSPITAL | Age: 50
End: 2025-02-16
Payer: COMMERCIAL

## 2025-02-16 DIAGNOSIS — J32.9 SINUSITIS, UNSPECIFIED CHRONICITY, UNSPECIFIED LOCATION: Primary | ICD-10-CM

## 2025-02-16 PROCEDURE — 99214 OFFICE O/P EST MOD 30 MIN: CPT | Performed by: PHYSICIAN ASSISTANT

## 2025-02-16 RX ORDER — DOXYCYCLINE HYCLATE 100 MG
100 TABLET ORAL 2 TIMES DAILY
Qty: 14 TABLET | Refills: 0 | Status: SHIPPED | OUTPATIENT
Start: 2025-02-16 | End: 2025-02-23

## 2025-02-16 NOTE — ASSESSMENT & PLAN NOTE
Orders:    doxycycline hyclate (VIBRA-TABS) 100 mg tablet; Take 1 tablet (100 mg total) by mouth 2 (two) times a day for 7 days  Take doxycycline as prescribed.

## 2025-02-16 NOTE — PROGRESS NOTES
Virtual Regular Visit  Name: Derek Arango      : 1975      MRN: [id number]  Encounter Provider: Vibha Negrete PA-C  Encounter Date: 2025   Encounter department: VIRTUAL CARE       Verification of patient location:  Patient is located at Home in the following state in which I hold an active license [place] :  Assessment & Plan  Sinusitis, unspecified chronicity, unspecified location    Orders:    doxycycline hyclate (VIBRA-TABS) 100 mg tablet; Take 1 tablet (100 mg total) by mouth 2 (two) times a day for 7 days  Take doxycycline as prescribed.    Tylenol Sinus over the counter  Warm compresses over sinuses  Steam treatment (practice proper safety precautions when handing hot liquids/steam)  Over the counter saline nasal spray  Follow up with PCP in 3-5 days.  Proceed to  ER if symptoms worsen.    Eat yogurt with live and active cultures and/or take a probiotic at least 3 hours before or after antibiotic dose. Monitor stool for diarrhea and/or blood. If this occurs, contact primary care doctor ASAP.   Shelia with patient that magnesium and iron can decrease effectiveness of Doxy so she will hold off on taking these medications while on the doxycycline.  If tests have been ordered our office will contact you with results if changes need to be made to the care plan discussed with you at the visit.  You can review your full results on [facility] MyChart  Follow up with PCP in 3-5 days.  Proceed to  ER if symptoms worsen.  If you need to contact care everywhere please call [phone]      Encounter provider Vibha Negrete PA-C    The patient was identified by name and date of birth. Derek Arango was informed that this is a telemedicine visit and that the visit is being conducted through the Epic Embedded platform. She agrees to proceed..  My office door was closed. No one else was in the room.  She acknowledged consent and understanding of privacy and security of the video platform. The patient  has agreed to participate and understands they can discontinue the visit at any time.    Patient is aware this is a billable service.     History of Present Illness     Is a 49-year-old female here with a past medical history of hypertension, sleep apnea, type 2 diabetes and hyperlipidemia here complaining of nasal congestion for the last 10 days.  She notes about 3 days ago she developed significant sinus pain and pressure on the left side of her face.  She denies ear pain, fever, vomiting or diarrhea, shortness of breath, chest pain, sore throat or cough.  Last period per patient was February 4, 2025      Review of Systems   Constitutional:  Negative for fever.   HENT:  Positive for congestion, sinus pressure and sinus pain. Negative for sore throat.    Respiratory:  Negative for cough and shortness of breath.    Cardiovascular:  Negative for chest pain.   Gastrointestinal:  Negative for diarrhea and vomiting.       Objective   There were no vitals taken for this visit.    Physical Exam  Constitutional:       General: She is not in acute distress.     Appearance: Normal appearance. She is not ill-appearing, toxic-appearing or diaphoretic.   HENT:      Nose: Congestion present.      Left Sinus: Maxillary sinus tenderness and frontal sinus tenderness present.   Eyes:      Conjunctiva/sclera: Conjunctivae normal.   Pulmonary:      Effort: Pulmonary effort is normal.      Comments: Patient able to speak in multiple full sentences without needing to break or pause.    Neurological:      General: No focal deficit present.      Mental Status: She is alert and oriented to person, place, and time.   Psychiatric:         Mood and Affect: Mood normal.         Behavior: Behavior normal.         Visit Time  Total Visit Duration: 5 min

## 2025-03-18 PROBLEM — J32.9 SINUSITIS: Status: RESOLVED | Noted: 2025-02-16 | Resolved: 2025-03-18

## 2025-05-09 ENCOUNTER — OFFICE VISIT (OUTPATIENT)
Age: 50
End: 2025-05-09
Payer: COMMERCIAL

## 2025-05-09 VITALS
HEIGHT: 65 IN | DIASTOLIC BLOOD PRESSURE: 82 MMHG | BODY MASS INDEX: 48.82 KG/M2 | HEART RATE: 83 BPM | OXYGEN SATURATION: 97 % | TEMPERATURE: 98.7 F | SYSTOLIC BLOOD PRESSURE: 132 MMHG | WEIGHT: 293 LBS

## 2025-05-09 DIAGNOSIS — G47.33 OSA (OBSTRUCTIVE SLEEP APNEA): Primary | ICD-10-CM

## 2025-05-09 DIAGNOSIS — E66.813 CLASS 3 SEVERE OBESITY WITHOUT SERIOUS COMORBIDITY WITH BODY MASS INDEX (BMI) OF 50.0 TO 59.9 IN ADULT: ICD-10-CM

## 2025-05-09 PROCEDURE — 99214 OFFICE O/P EST MOD 30 MIN: CPT | Performed by: INTERNAL MEDICINE

## 2025-05-09 NOTE — LETTER
May 9, 2025     Vashti Arango    Patient: Derek Arango   YOB: 1975   Date of Visit: 5/9/2025       Dear Dr. Vashti Arango:    Thank you for referring Derek Arango to me for evaluation. Below are my notes for this consultation.    If you have questions, please do not hesitate to call me. I look forward to following your patient along with you.         Sincerely,        Cricket Perkins DO        CC: No Recipients    Cricket Perkins DO  5/9/2025 12:36 PM  Sign when Signing Visit  Progress Note - Sleep Medicine  Derek Arango 49 y.o. female MRN: [id number]       Impression & Plan:   49 y.o. F with PMHx of PTSD, DM type 2, HTN who comes in for management of WILIAM.  1.  WILIAM (AHI - unknown) on autoPAP 10-15 with excellent compliance and good clinical response.  Residual AHI - 0.1  (DME -[employer], machine Resmed Airsense 10).    Currently on Mounjaro      -  As she continues to lose weight, I will allow more variability with her machine.  Will switch pressure to 8-15      -  Follow compliance in 1 year      -  Discussed in depth the results of the compliance.  Answered all questions regarding treatment      -  I also discussed in depth the risk of leaving sleep apnea untreated including hypertension, heart failure, arrhythmia, MI and stroke.     2,  Obesity - she continues to use Mounjaro and has been doing well with weight loss.      ______________________________________________________________________    HPI:    Derek Arango presents today for follow-up for her WILIAM.  She states that she has been doing well overall.  She denies mask leak, pressure intolerance or morning headache. She feels refreshed with usage of her device.   She can not sleep without the CPAP.        Social history updates:  Social History     Tobacco Use   Smoking Status Never   Smokeless Tobacco Never     Social History     Socioeconomic History   • Marital status: /Civil Union     Spouse  "name: Not on file   • Number of children: Not on file   • Years of education: Not on file   • Highest education level: Not on file   Occupational History   • Not on file   Tobacco Use   • Smoking status: Never   • Smokeless tobacco: Never   Vaping Use   • Vaping status: Never Used   Substance and Sexual Activity   • Alcohol use: Yes     Alcohol/week: 3.0 standard drinks of alcohol     Types: 1 Glasses of wine, 1 Shots of liquor, 1 Standard drinks or equivalent per week     Comment: socially a few time a month   • Drug use: Never   • Sexual activity: Yes     Partners: Female     Birth control/protection: None   Other Topics Concern   • Not on file   Social History Narrative   • Not on file     Social Drivers of Health     Financial Resource Strain: Not on file   Food Insecurity: Not on file   Transportation Needs: Not on file   Physical Activity: Inactive (9/27/2023)    Received from [facility]    Exercise Vital Sign    • Days of Exercise per Week: 0 days    • Minutes of Exercise per Session: 0 min   Stress: Not on file   Social Connections: Not on file   Intimate Partner Violence: Not on file   Housing Stability: Not on file       PhysicalExamination:  Vitals:   /82 (BP Location: Left arm, Patient Position: Sitting, Cuff Size: Standard)   Pulse 83   Temp 98.7 °F (37.1 °C) (Tympanic)   Ht 5' 5\" (1.651 m)   Wt (!) 146 kg (322 lb 9.6 oz)   SpO2 97%   BMI 53.68 kg/m²   General: Pleasant, Awake alert and oriented x 3, conversant without conversational dyspnea, NAD, normal affect  HEENT:  PERRL, Sclera noninjected, nonicteric OU, Nares patent,  no craniofacial abnormalities, Mucous membranes, moist, no oral lesions, normal dentition  NECK: Trachea midline, no accessory muscle use, no stridor, no cervical or supraclavicular adenopathy, JVP not elevated  CARDIAC: Reg, single s1/S2, no m/r/g  PULM: CTA bilaterally no wheezing, rhonchi or rales  ABD: Normoactive bowel sounds, soft nontender, " "nondistended, no rebound, no rigidity, no guarding  EXT: No cyanosis, no clubbing, no edema, normal capillary refill  NEURO: no focal neurologic deficits, AAOx3, moving all extremities appropriately      Diagnostic Data:  Labs:  I personally reviewed the most recent laboratory data pertinent to today's visit  not applicable    I have personally reviewed pertinent lab results.  No results found for: \"WBC\", \"HGB\", \"HCT\", \"MCV\", \"PLT\"  Lab Results   Component Value Date    CALCIUM 10.5 (H) 01/29/2025    K 4.4 01/29/2025    CO2 23 01/29/2025     01/29/2025    BUN 22 01/29/2025    CREATININE 0.77 01/29/2025     No results found for: \"IGE\"  Lab Results   Component Value Date    ALT 20 01/29/2025    AST 12 01/29/2025    ALKPHOS 67 01/29/2025     Sleep studies:  Diagnostic: [facility]     New Compliance Data:  4/6/25 - 5/6/25                                  Type of CPAP:  10-15, mean 10.4, max 12.4                                   Percent usage: 100%                                   Average time used: 7 hrs 41 minutes                                   Residual AHI: 0.1                                     Compliance Data:  1/6/25 - 2/4/25                                  Type of CPAP:  13                                   Percent usage: 100%                                   Average time used: 7 hrs 45 minutes                                   Residual AHI: 0.2                                         Cricket Perkins,     "

## 2025-05-09 NOTE — PROGRESS NOTES
Progress Note - Sleep Medicine  Derek Arango 49 y.o. female MRN: [id number]       Impression & Plan:   49 y.o. F with PMHx of PTSD, DM type 2, HTN who comes in for management of WILIAM.  1.  WILIAM (AHI - unknown) on autoPAP 10-15 with excellent compliance and good clinical response.  Residual AHI - 0.1  (DME -[facility], machine Resmed Airsense 10).    Currently on Mounjaro      -  As she continues to lose weight, I will allow more variability with her machine.  Will switch pressure to 8-15      -  Follow compliance in 1 year      -  Discussed in depth the results of the compliance.  Answered all questions regarding treatment      -  I also discussed in depth the risk of leaving sleep apnea untreated including hypertension, heart failure, arrhythmia, MI and stroke.     2,  Obesity - she continues to use Mounjaro and has been doing well with weight loss.      ______________________________________________________________________    HPI:    Derek Arango presents today for follow-up for her WILIAM.  She states that she has been doing well overall.  She denies mask leak, pressure intolerance or morning headache. She feels refreshed with usage of her device.   She can not sleep without the CPAP.        Social history updates:  Social History     Tobacco Use   Smoking Status Never   Smokeless Tobacco Never     Social History     Socioeconomic History    Marital status: /Civil Union     Spouse name: Not on file    Number of children: Not on file    Years of education: Not on file    Highest education level: Not on file   Occupational History    Not on file   Tobacco Use    Smoking status: Never    Smokeless tobacco: Never   Vaping Use    Vaping status: Never Used   Substance and Sexual Activity    Alcohol use: Yes     Alcohol/week: 3.0 standard drinks of alcohol     Types: 1 Glasses of wine, 1 Shots of liquor, 1 Standard drinks or equivalent per week     Comment: socially a few time a month    Drug use: Never  "   Sexual activity: Yes     Partners: Female     Birth control/protection: None   Other Topics Concern    Not on file   Social History Narrative    Not on file     Social Drivers of Health     Financial Resource Strain: Not on file   Food Insecurity: Not on file   Transportation Needs: Not on file   Physical Activity: Inactive (9/27/2023)    Received from [employer], [employer]    Exercise Vital Sign     Days of Exercise per Week: 0 days     Minutes of Exercise per Session: 0 min   Stress: Not on file   Social Connections: Not on file   Intimate Partner Violence: Not on file   Housing Stability: Not on file       PhysicalExamination:  Vitals:   /82 (BP Location: Left arm, Patient Position: Sitting, Cuff Size: Standard)   Pulse 83   Temp 98.7 °F (37.1 °C) (Tympanic)   Ht 5' 5\" (1.651 m)   Wt (!) 146 kg (322 lb 9.6 oz)   SpO2 97%   BMI 53.68 kg/m²   General: Pleasant, Awake alert and oriented x 3, conversant without conversational dyspnea, NAD, normal affect  HEENT:  PERRL, Sclera noninjected, nonicteric OU, Nares patent,  no craniofacial abnormalities, Mucous membranes, moist, no oral lesions, normal dentition  NECK: Trachea midline, no accessory muscle use, no stridor, no cervical or supraclavicular adenopathy, JVP not elevated  CARDIAC: Reg, single s1/S2, no m/r/g  PULM: CTA bilaterally no wheezing, rhonchi or rales  ABD: Normoactive bowel sounds, soft nontender, nondistended, no rebound, no rigidity, no guarding  EXT: No cyanosis, no clubbing, no edema, normal capillary refill  NEURO: no focal neurologic deficits, AAOx3, moving all extremities appropriately      Diagnostic Data:  Labs:  I personally reviewed the most recent laboratory data pertinent to today's visit  not applicable    I have personally reviewed pertinent lab results.  No results found for: \"WBC\", \"HGB\", \"HCT\", \"MCV\", \"PLT\"  Lab Results   Component Value Date    CALCIUM 10.5 (H) 01/29/2025    K 4.4 01/29/2025    CO2 23 01/29/2025    " " 01/29/2025    BUN 22 01/29/2025    CREATININE 0.77 01/29/2025     No results found for: \"IGE\"  Lab Results   Component Value Date    ALT 20 01/29/2025    AST 12 01/29/2025    ALKPHOS 67 01/29/2025     Sleep studies:  Diagnostic: [facility] and [facility] Compliance Data:  4/6/25 - 5/6/25                                  Type of CPAP:  10-15, mean 10.4, max 12.4                                   Percent usage: 100%                                   Average time used: 7 hrs 41 minutes                                   Residual AHI: 0.1                                     Compliance Data:  1/6/25 - 2/4/25                                  Type of CPAP:  13                                   Percent usage: 100%                                   Average time used: 7 hrs 45 minutes                                   Residual AHI: 0.2                                         Cricket Perkins DO    "

## 2025-05-12 LAB
DME PARACHUTE DELIVERY DATE ACTUAL: NORMAL
DME PARACHUTE DELIVERY DATE REQUESTED: NORMAL
DME PARACHUTE ITEM DESCRIPTION: NORMAL
DME PARACHUTE ORDER STATUS: NORMAL
DME PARACHUTE SUPPLIER NAME: NORMAL
DME PARACHUTE SUPPLIER PHONE: NORMAL

## 2025-05-23 ENCOUNTER — TELEPHONE (OUTPATIENT)
Age: 50
End: 2025-05-23

## 2025-05-23 ENCOUNTER — PREP FOR PROCEDURE (OUTPATIENT)
Age: 50
End: 2025-05-23

## 2025-05-23 DIAGNOSIS — Z12.11 SCREENING FOR COLON CANCER: Primary | ICD-10-CM

## 2025-05-23 NOTE — TELEPHONE ENCOUNTER
05/23/25  Screened by: Kassidy Hunt    Referring Provider     Pre- Screening:     There is no height or weight on file to calculate BMI.53.68  Ht-5'5  Wt-322lbs  Has patient been referred for a routine screening Colonoscopy? yes  Is the patient between 45-75 years old? yes      Previous Colonoscopy no   If yes:    Date:     Facility:     Reason:       Does the patient want to see a Gastroenterologist prior to their procedure OR are they having any GI symptoms? no    Has the patient been hospitalized or had abdominal surgery in the past 6 months? no    Does the patient use supplemental oxygen? no    Does the patient take Coumadin, Lovenox, Plavix, Elliquis, Xarelto, or other blood thinning medication? no    Has the patient had a stroke, cardiac event, or stent placed in the past year? no       If patient is between 45yrs - 49yrs, please advise patient that we will have to confirm benefits & coverage with their insurance company for a routine screening colonoscopy.

## 2025-05-23 NOTE — TELEPHONE ENCOUNTER
Rescheduled from 7/1/25      Scheduled date of colonoscopy (as of today):7/9/25    Physician performing colonoscopy: Dr Darling    Location of colonoscopy:[facility]    Bowel prep reviewed with patient:miralax/dulcolax    Instructions reviewed with patient by:sent prep instructions to the email on chart    Clearances: N/A

## 2025-05-23 NOTE — LETTER
Hello,    Attached are your prep instructions for your upcoming procedure on 7/1/2025. If you have any questions, please give us a call at [phone].    Thank you,     [facility]

## 2025-05-23 NOTE — TELEPHONE ENCOUNTER
Scheduled date of colonoscopy (as of today):7/1/2025  Physician performing colonoscopy:Dr. Darling  Location of colonoscopy:[place]  Bowel prep reviewed with patient:Manuel/Dul  Instructions reviewed with patient by:MELBA-Manuel/Dul prep instructions sent to [facility]  Clearances: n/a

## 2025-06-25 ENCOUNTER — TELEPHONE (OUTPATIENT)
Dept: GASTROENTEROLOGY | Facility: CLINIC | Age: 50
End: 2025-06-25

## 2025-06-25 NOTE — TELEPHONE ENCOUNTER
Procedure confirmed: Colonoscopy    Via: Voicemail    Instructions given: Email or [employer]    Prep Given: Miralax/Dulcolax    Provider: Dr. Roger Darling    Date: 7-9-25    Location: [address]  [address]    Medication holds: Yes      Mounjaro and Oral Iron 7 day hold, last dose on or before 7-1-25, Jardiance 4 day hold, last dose 7-4-25      Diabetic: Yes     Call the office if there are any questions.       [phone]

## 2025-07-08 ENCOUNTER — ANESTHESIA EVENT (OUTPATIENT)
Dept: ANESTHESIOLOGY | Facility: HOSPITAL | Age: 50
End: 2025-07-08

## 2025-07-08 ENCOUNTER — ANESTHESIA (OUTPATIENT)
Dept: ANESTHESIOLOGY | Facility: HOSPITAL | Age: 50
End: 2025-07-08

## 2025-07-09 ENCOUNTER — ANESTHESIA (OUTPATIENT)
Dept: GASTROENTEROLOGY | Facility: HOSPITAL | Age: 50
End: 2025-07-09
Payer: COMMERCIAL

## 2025-07-09 ENCOUNTER — ANESTHESIA EVENT (OUTPATIENT)
Dept: GASTROENTEROLOGY | Facility: HOSPITAL | Age: 50
End: 2025-07-09
Payer: COMMERCIAL

## 2025-07-09 ENCOUNTER — HOSPITAL ENCOUNTER (OUTPATIENT)
Dept: GASTROENTEROLOGY | Facility: HOSPITAL | Age: 50
Setting detail: OUTPATIENT SURGERY
Discharge: HOME | End: 2025-07-09
Attending: INTERNAL MEDICINE
Payer: COMMERCIAL

## 2025-07-09 VITALS
BODY MASS INDEX: 48.82 KG/M2 | DIASTOLIC BLOOD PRESSURE: 63 MMHG | WEIGHT: 293 LBS | HEIGHT: 65 IN | HEART RATE: 80 BPM | OXYGEN SATURATION: 97 % | RESPIRATION RATE: 22 BRPM | SYSTOLIC BLOOD PRESSURE: 119 MMHG | TEMPERATURE: 98.3 F

## 2025-07-09 DIAGNOSIS — Z12.11 SCREENING FOR COLON CANCER: ICD-10-CM

## 2025-07-09 LAB
EXT PREGNANCY TEST URINE: NEGATIVE
EXT. CONTROL: NORMAL
GLUCOSE SERPL-MCNC: 129 MG/DL (ref 65–140)

## 2025-07-09 PROCEDURE — 88305 TISSUE EXAM BY PATHOLOGIST: CPT | Performed by: STUDENT IN AN ORGANIZED HEALTH CARE EDUCATION/TRAINING PROGRAM

## 2025-07-09 PROCEDURE — 82948 REAGENT STRIP/BLOOD GLUCOSE: CPT

## 2025-07-09 PROCEDURE — 81025 URINE PREGNANCY TEST: CPT | Performed by: INTERNAL MEDICINE

## 2025-07-09 PROCEDURE — 45380 COLONOSCOPY AND BIOPSY: CPT | Performed by: INTERNAL MEDICINE

## 2025-07-09 RX ORDER — PROPOFOL 10 MG/ML
INJECTION, EMULSION INTRAVENOUS CONTINUOUS PRN
Status: DISCONTINUED | OUTPATIENT
Start: 2025-07-09 | End: 2025-07-09

## 2025-07-09 RX ORDER — SODIUM CHLORIDE 9 MG/ML
INJECTION, SOLUTION INTRAVENOUS CONTINUOUS PRN
Status: DISCONTINUED | OUTPATIENT
Start: 2025-07-09 | End: 2025-07-09

## 2025-07-09 RX ORDER — PROPOFOL 10 MG/ML
INJECTION, EMULSION INTRAVENOUS AS NEEDED
Status: DISCONTINUED | OUTPATIENT
Start: 2025-07-09 | End: 2025-07-09

## 2025-07-09 RX ORDER — LIDOCAINE HYDROCHLORIDE 10 MG/ML
INJECTION, SOLUTION EPIDURAL; INFILTRATION; INTRACAUDAL; PERINEURAL AS NEEDED
Status: DISCONTINUED | OUTPATIENT
Start: 2025-07-09 | End: 2025-07-09

## 2025-07-09 RX ADMIN — LIDOCAINE HYDROCHLORIDE 50 MG: 10 INJECTION, SOLUTION EPIDURAL; INFILTRATION; INTRACAUDAL at 09:26

## 2025-07-09 RX ADMIN — PROPOFOL 120 MCG/KG/MIN: 10 INJECTION, EMULSION INTRAVENOUS at 09:26

## 2025-07-09 RX ADMIN — SODIUM CHLORIDE: 0.9 INJECTION, SOLUTION INTRAVENOUS at 08:24

## 2025-07-09 RX ADMIN — PROPOFOL 140 MG: 10 INJECTION, EMULSION INTRAVENOUS at 09:26

## 2025-07-09 NOTE — ANESTHESIA PREPROCEDURE EVALUATION
Procedure:  COLONOSCOPY  Mounjaro stopped 1 week ago  Relevant Problems   CARDIO   (+) Mixed hyperlipidemia   (+) Primary hypertension      ENDO   (+) Type 2 diabetes mellitus with hyperglycemia, without long-term current use of insulin (HCC)      /RENAL   (+) Microalbuminuria due to type 2 diabetes mellitus  (HCC)      PULMONARY   (+) WILIAM (obstructive sleep apnea) (Cpap routine use)      Other   (+) Class 3 severe obesity without serious comorbidity with body mass index (BMI) of 50.0 to 59.9 in adult        Physical Exam    Airway     Mallampati score: II  TM Distance: >3 FB  Neck ROM: full  Mouth opening: >= 4 cm      Cardiovascular      Dental   No notable dental hx     Pulmonary      Neurological      Other Findings  post-pubertal.      Anesthesia Plan  ASA Score- 3     Anesthesia Type- IV sedation with anesthesia with ASA Monitors.         Additional Monitors:     Airway Plan: natural airway.           Plan Factors-Exercise tolerance (METS): <4 METS.    Chart reviewed.   Existing labs reviewed. Patient summary reviewed.    Patient is not a current smoker.              Induction- intravenous.    Postoperative Plan- .   Monitoring Plan - Monitoring plan - standard ASA monitoring          Informed Consent- Anesthetic plan and risks discussed with patient.  I personally reviewed this patient with the CRNA. Discussed and agreed on the Anesthesia Plan with the CRNA..      NPO Status:  Vitals Value Taken Time   Date of last liquid 07/09/25 07/09/25 08:01   Time of last liquid 0400 07/09/25 08:01   Date of last solid 07/07/25 07/09/25 08:01   Time of last solid 1830 07/09/25 08:01

## 2025-07-09 NOTE — ANESTHESIA POSTPROCEDURE EVALUATION
Post-Op Assessment Note    CV Status:  Stable  Pain Score: 0    Pain management: adequate       Mental Status:  Awake and alert   Hydration Status:  Stable   PONV Controlled:  None   Airway Patency:  Patent     Post Op Vitals Reviewed: Yes    No anethesia notable event occurred.    Staff: CRNA           Last Filed PACU Vitals:  Vitals Value Taken Time   Temp     Pulse 67    BP     Resp 15    SpO2 95%

## 2025-07-09 NOTE — H&P
"History and Physical -  [facility]  Derek Arango 50 y.o. female MRN: [id number]    HPI: Derek Arango is a 50 y.o. female who presents for screening colonoscopy.  She has never had a colonoscopy and denies family history of colon cancer    REVIEW OF SYSTEMS: Per the HPI, and otherwise unremarkable.    Historical Information   Past Medical History[1]  Past Surgical History[2]  Social History   Social History     Substance and Sexual Activity   Alcohol Use Yes    Alcohol/week: 3.0 standard drinks of alcohol    Types: 1 Glasses of wine, 1 Shots of liquor, 1 Standard drinks or equivalent per week    Comment: socially a few time a month     Social History     Substance and Sexual Activity   Drug Use Never     Tobacco Use History[3]  Family History[4]    Meds/Allergies     Current Medications[5]    Allergies[6]    Objective     Temp 98.3 °F (36.8 °C) (Oral)   Ht 5' 5\" (1.651 m)   Wt (!) 141 kg (310 lb)   BMI 51.59 kg/m²     PHYSICAL EXAM    General Appearance: NAD, cooperative, alert  Eyes: Anicteric  GI:  Soft, non-tender, non-distended; normal bowel sounds; no masses, no organomegaly   Rectal: Deferred until procedure  Musculoskeletal: No edema.  Skin:  No jaundice    ASSESSMENT/PLAN:  This is a 50 y.o. female here for colonoscopy, and she is stable and optimized for her procedure.             [1]   Past Medical History:  Diagnosis Date    CPAP (continuous positive airway pressure) dependence     Diabetes mellitus (HCC)     GERD (gastroesophageal reflux disease)     Hyperlipidemia     Hypertension     Migraine     PONV (postoperative nausea and vomiting)     Sleep apnea    [2]   Past Surgical History:  Procedure Laterality Date    CYST REMOVAL     [3]   Social History  Tobacco Use   Smoking Status Never   Smokeless Tobacco Never   [4]   Family History  Problem Relation Name Age of Onset    Diabetes type II Mother Jarrod     Cancer Mother Jarrod     Deep vein thrombosis Father          " multiple    Diabetes type II Maternal Aunt      Diabetes type II Maternal Aunt      Diabetes type II Maternal Uncle      Diabetes type II Maternal Uncle      No Known Problems Paternal Aunt      Diabetes type II Maternal Grandmother      Diabetes type II Maternal Grandfather     [5]   Current Outpatient Medications:     aspirin (ECOTRIN LOW STRENGTH) 81 mg EC tablet    atorvastatin (LIPITOR) 20 mg tablet    Cyanocobalamin 1000 MCG CAPS    Empagliflozin (JARDIANCE) 10 MG TABS tablet    Ferrous Sulfate (IRON PO)    fexofenadine (ALLEGRA) 180 MG tablet    hydroCHLOROthiazide 25 mg tablet    lisinopril (ZESTRIL) 10 mg tablet    MAGNESIUM PO    metFORMIN (GLUCOPHAGE) 1000 MG tablet    Multiple Vitamin (MULTIVITAMIN ADULT PO)    Tirzepatide (Mounjaro) 7.5 MG/0.5ML SOAJ    Turmeric 500 MG CAPS    CINNAMON PO    esomeprazole (NexIUM) 40 MG capsule    tirzepatide (Mounjaro) 5 MG/0.5ML  [6]   Allergies  Allergen Reactions    Codeine Vomiting    Morphine Other (See Comments), Vomiting and GI Intolerance    Other Hives and Nausea Only     Sensitive to all  Narcotics per pt    [place] (Diagnostic) - Food Allergy Hives    Strawberry Extract - Food Allergy Hives    Oxycodone-Acetaminophen Vomiting    Amoxicillin Rash

## 2025-07-10 NOTE — ANESTHESIA POSTPROCEDURE EVALUATION
Post-Op Assessment Note    CV Status:  Stable  Pain Score: 0    Pain management: adequate       Mental Status:  Alert and awake   Hydration Status:  Euvolemic   PONV Controlled:  Controlled   Airway Patency:  Patent     Post Op Vitals Reviewed: Yes    No anethesia notable event occurred.    Staff: Anesthesiologist           Last Filed PACU Vitals:  Vitals Value Taken Time   Temp     Pulse 80 07/09/25 09:58   /63 07/09/25 09:58   Resp 22 07/09/25 09:58   SpO2 97 % 07/09/25 09:58       Modified Jade:     Vitals Value Taken Time   Activity 2 07/09/25 10:00   Respiration 2 07/09/25 10:00   Circulation 2 07/09/25 10:00   Consciousness 2 07/09/25 10:00   Oxygen Saturation 2 07/09/25 10:00     Modified Jade Score: 10

## 2025-07-15 ENCOUNTER — OFFICE VISIT (OUTPATIENT)
Dept: ENDOCRINOLOGY | Facility: HOSPITAL | Age: 50
End: 2025-07-15
Payer: COMMERCIAL

## 2025-07-15 VITALS
DIASTOLIC BLOOD PRESSURE: 78 MMHG | WEIGHT: 293 LBS | SYSTOLIC BLOOD PRESSURE: 112 MMHG | HEIGHT: 65 IN | BODY MASS INDEX: 48.82 KG/M2 | HEART RATE: 82 BPM

## 2025-07-15 DIAGNOSIS — E78.2 MIXED HYPERLIPIDEMIA: ICD-10-CM

## 2025-07-15 DIAGNOSIS — I10 PRIMARY HYPERTENSION: ICD-10-CM

## 2025-07-15 DIAGNOSIS — E11.29 MICROALBUMINURIA DUE TO TYPE 2 DIABETES MELLITUS  (HCC): ICD-10-CM

## 2025-07-15 DIAGNOSIS — E11.65 TYPE 2 DIABETES MELLITUS WITH HYPERGLYCEMIA, WITHOUT LONG-TERM CURRENT USE OF INSULIN (HCC): Primary | ICD-10-CM

## 2025-07-15 DIAGNOSIS — R80.9 MICROALBUMINURIA DUE TO TYPE 2 DIABETES MELLITUS  (HCC): ICD-10-CM

## 2025-07-15 LAB — SL AMB POCT HEMOGLOBIN AIC: 5.9 (ref ?–6.5)

## 2025-07-15 PROCEDURE — 83036 HEMOGLOBIN GLYCOSYLATED A1C: CPT | Performed by: INTERNAL MEDICINE

## 2025-07-15 PROCEDURE — 99214 OFFICE O/P EST MOD 30 MIN: CPT | Performed by: INTERNAL MEDICINE

## 2025-07-15 PROCEDURE — 88305 TISSUE EXAM BY PATHOLOGIST: CPT | Performed by: STUDENT IN AN ORGANIZED HEALTH CARE EDUCATION/TRAINING PROGRAM

## 2025-07-15 RX ORDER — TIRZEPATIDE 10 MG/.5ML
INJECTION, SOLUTION SUBCUTANEOUS
Qty: 2 ML | Refills: 6 | Status: SHIPPED | OUTPATIENT
Start: 2025-07-15

## 2025-07-15 NOTE — LETTER
2025     No Recipients    Patient: Derek Arango   YOB: 1975   Date of Visit: 7/15/2025       Dear MICAH Kim:    Thank you for referring Derek Arango to me for evaluation. Below are my notes for this consultation.    If you have questions, please do not hesitate to call me. I look forward to following your patient along with you.         Sincerely,        Kirsten Dupont MD        CC: No Recipients    Kirsten Dupont MD  2025  9:28 PM  Incomplete  Name: Derek Arango      : 1975      MRN: [id number]  Encounter Provider: Kirsten Dupont MD  Encounter Date: 7/15/2025   Encounter department: [facility] [place]    No chief complaint on file.  :  Assessment & Plan  Type 2 diabetes mellitus with hyperglycemia, without long-term current use of insulin (HCC)    Lab Results   Component Value Date    HGBA1C 5.9 07/15/2025       Orders:  •  POCT hemoglobin A1c  •  Tirzepatide (Mounjaro) 10 MG/0.5ML SOAJ; Inject 10 mg once a week.  •  Comprehensive metabolic panel; Future  •  TSH, 3rd generation; Future  •  CBC and differential; Future  •  Lipid Panel with Direct LDL reflex; Future  •  Hemoglobin A1c (w/out EAG); Future  •  Microalbumin, Random Urine (W/Creatinine); Future    Microalbuminuria due to type 2 diabetes mellitus  (HCC)    Lab Results   Component Value Date    HGBA1C 5.9 07/15/2025       Orders:  •  Comprehensive metabolic panel; Future  •  TSH, 3rd generation; Future  •  CBC and differential; Future  •  Lipid Panel with Direct LDL reflex; Future  •  Hemoglobin A1c (w/out EAG); Future  •  Microalbumin, Random Urine (W/Creatinine); Future    Primary hypertension    Orders:  •  Comprehensive metabolic panel; Future  •  TSH, 3rd generation; Future  •  CBC and differential; Future  •  Lipid Panel with Direct LDL reflex; Future  •  Hemoglobin A1c (w/out EAG); Future  •  Microalbumin, Random Urine (W/Creatinine);  Future    Mixed hyperlipidemia    Orders:  •  Comprehensive metabolic panel; Future  •  TSH, 3rd generation; Future  •  CBC and differential; Future  •  Lipid Panel with Direct LDL reflex; Future  •  Hemoglobin A1c (w/out EAG); Future  •  Microalbumin, Random Urine (W/Creatinine); Future      Assessment & Plan  1. Type 2 diabetes mellitus: Stable.  - Hemoglobin A1c level remains stable at 5.9%, indicating excellent control of diabetes.  - Discussed increasing Mounjaro to 10 mg once a week to potentially aid in further weight loss and diabetes control. If gastrointestinal discomfort occurs, the dosage will be reduced. Reviewed the importance of continuing Jardiance 10 mg daily and metformin 1000 mg twice a day. Advised to continue monitoring blood glucose levels daily. Fasting lab work order will be placed for the next visit in approximately 4 months to include a urine study and thyroid function tests.  - Mounjaro increased to 10 mg once a week. No changes to Jardiance or metformin. Lab work order for next visit.    2. Obesity.  - Lost approximately 18-20 pounds since the last visit and a total of 27 pounds since starting Mounjaro.  - Discussed increasing Mounjaro to 10 mg once a week to potentially aid in further weight loss. If gastrointestinal discomfort occurs, the dosage will be reduced.  - Mounjaro increased to 10 mg once a week.    3. Hypertension.  - Currently on hydrochlorothiazide 25 mg daily and lisinopril 10 mg daily for blood pressure control and kidney protection from diabetes. No changes to hypertension management plan are necessary at this time.    4. Hyperlipidemia.  - Currently on atorvastatin 20 mg daily for cholesterol management. No changes to hyperlipidemia management plan are necessary at this time.  Repeat lipid panel will be performed with her next visit.    5.  Microalbuminuria.  She will continue the current dose of lisinopril 10 mg daily.  A urine microalbumin to creatinine ratio will be  performed with her next visit.    Follow-up: Next visit in approximately 4 months with preceding hemoglobin A1c, CMP, CBC, TSH, lipid panel, and urine microalbumin to creatinine ratio.      Pertinent Medical History  Derek Arango  is a 50-year-old female with a history of type 2 diabetes, obesity, hypertension, and hyperlipidemia.    Her diabetes was diagnosed about 4 to 5 years ago. She was initially placed on metformin, Jardiance, and glimepiride. Rybelsus was tried but found to be ineffective, leading to the initiation of glimepiride. In February 2025, she was switched from glimepiride to Mounjaro.    Diabetes complications include microalbuminuria. She reports no neuropathy, retinopathy, heart attack, stroke, or claudication.        History of Present Illness{?Quick Links Encounters * My Last Note * Last Note in Specialty * Snapshot * Since Last Visit * History :73374}  History of Present Illness  Derek Arango  is a 50-year-old female with a history of type 2 diabetes, obesity, hypertension, and hyperlipidemia, here for a follow-up visit.    Her diabetes was diagnosed about 4 to 5 years ago. She was initially placed on metformin, Jardiance, and glimepiride. Rybelsus was tried but found to be ineffective, leading to the initiation of glimepiride. In February 2025, she was switched from glimepiride to Mounjaro.     She reports mild nausea and reflux but no significant stomach issues or abdominal pain. She has lost approximately 20 pounds since her last visit. She does not experience frequent urination or nighttime urination. She occasionally feels very thirsty or hungry. She typically takes Mounjaro on Sundays, which suppresses her appetite until Thursday, but by Friday, she feels extremely hungry.     She reports no blurry vision, numbness, tingling in her feet, or foot wounds. She experienced digestive issues after consuming something unusual during her vacation and took Tums to alleviate the symptoms.  "She is currently on Jardiance 10 mg daily, metformin 1000 mg twice daily, and Mounjaro 7.5 mg once weekly. Glimepiride has been discontinued.     Diabetes complications include microalbuminuria. She reports no neuropathy, retinopathy, heart attack, stroke, or claudication.    She is on atorvastatin 20 mg daily for cholesterol management.    She is on hydrochlorothiazide 25 mg daily and lisinopril 10 mg daily for blood pressure control and kidney protection from diabetes. She reports no chest pain or shortness of breath.    Home blood glucometer readings:   She monitors her blood sugar levels during the week, usually first thing in the morning, which range from the 130s to 150s. During a recent vacation, when she was not monitoring her diet, her blood sugar levels were in the 120s. She has never experienced low blood sugar levels.     Current diabetic regimen:   He takes Jardiance 10 mg daily, metformin 1000 mg twice daily, and Mounjaro 7.5 mg once a week for diabetes management.    She takes atorvastatin 20 mg daily for hyperlipidemia.    She takes hydrochlorothiazide 25 mg daily and lisinopril 10 mg daily for hypertension.      Review of Systems as per HPI    Medical History Reviewed by provider this encounter:     .  Medications Ordered Prior to Encounter[1]   Social History[2]     Medical History Reviewed by provider this encounter:     .    Objective{?Quick Links Trend Vitals * Enter New Vitals * Results Review * Timeline (Adult) * Labs * Imaging * Cardiology * Procedures * Lung Cancer Screening * Surgical eConsent :67762}  /78   Pulse 82   Ht 5' 5\" (1.651 m)   Wt (!) 143 kg (315 lb)   BMI 52.42 kg/m²      Body mass index is 52.42 kg/m².  Wt Readings from Last 3 Encounters:   07/15/25 (!) 143 kg (315 lb)   07/09/25 (!) 141 kg (310 lb)   05/09/25 (!) 146 kg (322 lb 9.6 oz)       Physical Exam    Physical Exam  Vitals and nursing note reviewed.   Constitutional:       General: She is not in acute " distress.     Appearance: Normal appearance. She is well-developed. She is obese.   HENT:      Head: Normocephalic and atraumatic.     Eyes:      Conjunctiva/sclera: Conjunctivae normal.     Neck:      Vascular: No carotid bruit.      Comments: Thyroid normal in size.  No palpable thyroid nodules.  Cardiovascular:      Rate and Rhythm: Normal rate and regular rhythm.      Heart sounds: Normal heart sounds. No murmur heard.  Pulmonary:      Effort: Pulmonary effort is normal. No respiratory distress.      Breath sounds: Normal breath sounds. No wheezing.   Abdominal:      Palpations: Abdomen is soft.     Musculoskeletal:         General: No swelling.      Cervical back: Neck supple.      Right lower leg: No edema.      Left lower leg: No edema.   Lymphadenopathy:      Cervical: No cervical adenopathy.     Skin:     General: Skin is warm and dry.     Neurological:      Mental Status: She is alert and oriented to person, place, and time.     Psychiatric:         Mood and Affect: Mood normal.       Last Eye Exam: Not on file  Last Foot Exam: 10/03/2024  Health Maintenance   Topic Date Due   • Diabetic Eye Exam  Never done   • Diabetic Foot Exam  10/03/2025       Results    Labs: I have reviewed pertinent labs including:   Lab Results   Component Value Date    HGBA1C 5.9 07/15/2025    HGBA1C 5.9 02/06/2025    HGBA1C 6.6 (H) 10/09/2024      Lab Results   Component Value Date    CREATININE 0.77 01/29/2025    CREATININE 0.67 10/09/2024    CREATININE 0.64 06/05/2024    BUN 22 01/29/2025    K 4.4 01/29/2025     01/29/2025    CO2 23 01/29/2025      GFR, Calculated   Date Value Ref Range Status   06/05/2024 108 > OR = 60 mL/min/1.73m2 Final     eGFR   Date Value Ref Range Status   01/29/2025 93 > OR = 60 mL/min/1.73m2 Final      HDL   Date Value Ref Range Status   10/09/2024 51 > OR = 50 mg/dL Final     Triglycerides   Date Value Ref Range Status   10/09/2024 123 <150 mg/dL Final      ALT   Date Value Ref Range Status    01/29/2025 20 6 - 29 U/L Final     ALANINE AMINOTRANSFERASE   Date Value Ref Range Status   06/05/2024 23 6 - 29 U/L Final     AST   Date Value Ref Range Status   01/29/2025 12 10 - 35 U/L Final   06/05/2024 12 10 - 35 U/L Final     Alkaline Phosphatase   Date Value Ref Range Status   01/29/2025 67 37 - 153 U/L Final   06/05/2024 64 37 - 153 U/L Final           There are no Patient Instructions on file for this visit.    Discussed with the patient and all questioned fully answered. She will call me if any problems arise.             [1]  Current Outpatient Medications on File Prior to Visit   Medication Sig Dispense Refill   • aspirin (ECOTRIN LOW STRENGTH) 81 mg EC tablet Take by mouth in the morning.     • atorvastatin (LIPITOR) 20 mg tablet Take 1 tablet (20 mg total) by mouth every evening 90 tablet 1   • Cyanocobalamin 1000 MCG CAPS Take by mouth in the morning.     • Empagliflozin (JARDIANCE) 10 MG TABS tablet Take 1 tablet (10 mg total) by mouth daily 90 tablet 3   • Ferrous Sulfate (IRON PO) Take by mouth in the morning     • fexofenadine (ALLEGRA) 180 MG tablet Take by mouth in the morning.     • hydroCHLOROthiazide 25 mg tablet Take 1 tablet (25 mg total) by mouth daily 90 tablet 1   • lisinopril (ZESTRIL) 10 mg tablet Take 1 tablet (10 mg total) by mouth daily 90 tablet 1   • MAGNESIUM PO Take by mouth in the morning     • metFORMIN (GLUCOPHAGE) 1000 MG tablet Take 1 tablet (1,000 mg total) by mouth 2 (two) times a day with meals 180 tablet 3   • Multiple Vitamin (MULTIVITAMIN ADULT PO) Take by mouth in the morning     • Tirzepatide (Mounjaro) 7.5 MG/0.5ML SOAJ Inject 7.5 mg once a week 6 mL 3   • Turmeric 500 MG CAPS Take by mouth in the morning       No current facility-administered medications on file prior to visit.   [2]  Social History  Tobacco Use   • Smoking status: Never   • Smokeless tobacco: Never   Vaping Use   • Vaping status: Never Used   Substance and Sexual Activity   • Alcohol use: Yes      Alcohol/week: 3.0 standard drinks of alcohol     Types: 1 Glasses of wine, 1 Shots of liquor, 1 Standard drinks or equivalent per week     Comment: socially a few time a month   • Drug use: Never   • Sexual activity: Yes     Partners: Female     Birth control/protection: None     Kirsten Dupont MD  7/15/2025  4:45 PM  Sign when Signing Visit  Name: Derek Arango      : 1975      MRN: [id number]  Encounter Provider: Kirsten Dupont MD  Encounter Date: 7/15/2025   Encounter department: [facility] [place]    No chief complaint on file.  :  Assessment & Plan  Type 2 diabetes mellitus with hyperglycemia, without long-term current use of insulin (HCC)    Lab Results   Component Value Date    HGBA1C 5.9 07/15/2025       Orders:  •  POCT hemoglobin A1c  •  Tirzepatide (Mounjaro) 10 MG/0.5ML SOAJ; Inject 10 mg once a week.  •  Comprehensive metabolic panel; Future  •  TSH, 3rd generation; Future  •  CBC and differential; Future  •  Lipid Panel with Direct LDL reflex; Future  •  Hemoglobin A1c (w/out EAG); Future  •  Microalbumin, Random Urine (W/Creatinine); Future    Microalbuminuria due to type 2 diabetes mellitus  (HCC)    Lab Results   Component Value Date    HGBA1C 5.9 07/15/2025       Orders:  •  Comprehensive metabolic panel; Future  •  TSH, 3rd generation; Future  •  CBC and differential; Future  •  Lipid Panel with Direct LDL reflex; Future  •  Hemoglobin A1c (w/out EAG); Future  •  Microalbumin, Random Urine (W/Creatinine); Future    Primary hypertension    Orders:  •  Comprehensive metabolic panel; Future  •  TSH, 3rd generation; Future  •  CBC and differential; Future  •  Lipid Panel with Direct LDL reflex; Future  •  Hemoglobin A1c (w/out EAG); Future  •  Microalbumin, Random Urine (W/Creatinine); Future    Mixed hyperlipidemia    Orders:  •  Comprehensive metabolic panel; Future  •  TSH, 3rd generation; Future  •  CBC and differential; Future  •  Lipid  "Panel with Direct LDL reflex; Future  •  Hemoglobin A1c (w/out EAG); Future  •  Microalbumin, Random Urine (W/Creatinine); Future      Assessment & Plan        Pertinent Medical History  ***  {There is no content from the last Pertinent Medical History section.}      History of Present Illness{?Quick Links Encounters * My Last Note * Last Note in Specialty * Snapshot * Since Last Visit * History :99459}  History of Present Illness  The patient is a 50-year-old female with a history of type 2 diabetes, obesity, hypertension, and hyperlipidemia, here for a follow-up visit. Diabetes was diagnosed about 4 to 5 years ago. She was placed on metformin, Jardiance, and glimepiride. Rybelsus was tried but found to be ineffective, and that is when glimepiride was started. She was switched from glimepiride to Mounjaro in February 2025.  {Diabetes HPI (Optional):92105}    {Home Glucometer v CGM Section (Optional):8051984971}    Current diabetic regimen:   He takes Jardiance 10 mg daily, metformin 1000 mg twice daily, and Mounjaro 7.5 mg once a week for diabetes management.    She takes atorvastatin 20 mg daily for hyperlipidemia.    She takes hydrochlorothiazide 25 mg daily and lisinopril 10 mg daily for hypertension.      Review of Systems as per HPI    {Select to insert medical history sections (Optional):59928}     Medical History Reviewed by provider this encounter:     .    Objective{?Quick Links Trend Vitals * Enter New Vitals * Results Review * Timeline (Adult) * Labs * Imaging * Cardiology * Procedures * Lung Cancer Screening * Surgical eConsent :21335}  /78   Pulse 82   Ht 5' 5\" (1.651 m)   Wt (!) 143 kg (315 lb)   BMI 52.42 kg/m²      Body mass index is 52.42 kg/m².  Wt Readings from Last 3 Encounters:   07/15/25 (!) 143 kg (315 lb)   07/09/25 (!) 141 kg (310 lb)   05/09/25 (!) 146 kg (322 lb 9.6 oz)     Physical Exam    Physical Exam    Assign Risk Category  {Diabetic Foot Exam Documentation " Incomplete}    Last Eye Exam: Not on file  Last Foot Exam: 10/03/2024  Health Maintenance   Topic Date Due   • Diabetic Eye Exam  Never done   • Diabetic Foot Exam  10/03/2025       Results    Labs: I have reviewed pertinent labs including: {SL AMB ENDO LABS (Optional):56834}    There are no Patient Instructions on file for this visit.    Discussed with the patient and all questioned fully answered. She will call me if any problems arise.    {Administrative / Billing Section (Optional):01529}

## 2025-07-15 NOTE — ASSESSMENT & PLAN NOTE
Lab Results   Component Value Date    HGBA1C 5.9 07/15/2025       Orders:    POCT hemoglobin A1c    Tirzepatide (Mounjaro) 10 MG/0.5ML SOAJ; Inject 10 mg once a week.    Comprehensive metabolic panel; Future    TSH, 3rd generation; Future    CBC and differential; Future    Lipid Panel with Direct LDL reflex; Future    Hemoglobin A1c (w/out EAG); Future    Microalbumin, Random Urine (W/Creatinine); Future

## 2025-07-15 NOTE — ASSESSMENT & PLAN NOTE
Orders:    Comprehensive metabolic panel; Future    TSH, 3rd generation; Future    CBC and differential; Future    Lipid Panel with Direct LDL reflex; Future    Hemoglobin A1c (w/out EAG); Future    Microalbumin, Random Urine (W/Creatinine); Future

## 2025-07-15 NOTE — ASSESSMENT & PLAN NOTE
Lab Results   Component Value Date    HGBA1C 5.9 07/15/2025       Orders:    Comprehensive metabolic panel; Future    TSH, 3rd generation; Future    CBC and differential; Future    Lipid Panel with Direct LDL reflex; Future    Hemoglobin A1c (w/out EAG); Future    Microalbumin, Random Urine (W/Creatinine); Future

## 2025-07-15 NOTE — LETTER
2025     MICAH Che  [address] [place] [id number]    Patient: Derek Arango   YOB: 1975   Date of Visit: 7/15/2025       Dear MICAH Kim:    Thank you for referring Derek Arango to me for evaluation. Below are my notes for this consultation.    If you have questions, please do not hesitate to call me. I look forward to following your patient along with you.         Sincerely,        Kirsten Dupont MD        CC: No Recipients    Kirsten Dupont MD  2025  9:28 PM  Sign when Signing Visit  Name: Derek Arango      : 1975      MRN: [id number]  Encounter Provider: Kirsten Dupont MD  Encounter Date: 7/15/2025   Encounter department: [facility] [place]    No chief complaint on file.  :  Assessment & Plan  Type 2 diabetes mellitus with hyperglycemia, without long-term current use of insulin (HCC)    Lab Results   Component Value Date    HGBA1C 5.9 07/15/2025       Orders:  •  POCT hemoglobin A1c  •  Tirzepatide (Mounjaro) 10 MG/0.5ML SOAJ; Inject 10 mg once a week.  •  Comprehensive metabolic panel; Future  •  TSH, 3rd generation; Future  •  CBC and differential; Future  •  Lipid Panel with Direct LDL reflex; Future  •  Hemoglobin A1c (w/out EAG); Future  •  Microalbumin, Random Urine (W/Creatinine); Future    Microalbuminuria due to type 2 diabetes mellitus  (HCC)    Lab Results   Component Value Date    HGBA1C 5.9 07/15/2025       Orders:  •  Comprehensive metabolic panel; Future  •  TSH, 3rd generation; Future  •  CBC and differential; Future  •  Lipid Panel with Direct LDL reflex; Future  •  Hemoglobin A1c (w/out EAG); Future  •  Microalbumin, Random Urine (W/Creatinine); Future    Primary hypertension    Orders:  •  Comprehensive metabolic panel; Future  •  TSH, 3rd generation; Future  •  CBC and differential; Future  •  Lipid Panel with Direct LDL reflex; Future  •  Hemoglobin A1c (w/out EAG);  Future  •  Microalbumin, Random Urine (W/Creatinine); Future    Mixed hyperlipidemia    Orders:  •  Comprehensive metabolic panel; Future  •  TSH, 3rd generation; Future  •  CBC and differential; Future  •  Lipid Panel with Direct LDL reflex; Future  •  Hemoglobin A1c (w/out EAG); Future  •  Microalbumin, Random Urine (W/Creatinine); Future      Assessment & Plan  1. Type 2 diabetes mellitus: Stable.  - Hemoglobin A1c level remains stable at 5.9%, indicating excellent control of diabetes.  - Discussed increasing Mounjaro to 10 mg once a week to potentially aid in further weight loss and diabetes control. If gastrointestinal discomfort occurs, the dosage will be reduced. Reviewed the importance of continuing Jardiance 10 mg daily and metformin 1000 mg twice a day. Advised to continue monitoring blood glucose levels daily. Fasting lab work order will be placed for the next visit in approximately 4 months to include a urine study and thyroid function tests.  - Mounjaro increased to 10 mg once a week. No changes to Jardiance or metformin. Lab work order for next visit.    2. Obesity.  - Lost approximately 18-20 pounds since the last visit and a total of 27 pounds since starting Mounjaro.  - Discussed increasing Mounjaro to 10 mg once a week to potentially aid in further weight loss. If gastrointestinal discomfort occurs, the dosage will be reduced.  - Mounjaro increased to 10 mg once a week.    3. Hypertension.  - Currently on hydrochlorothiazide 25 mg daily and lisinopril 10 mg daily for blood pressure control and kidney protection from diabetes. No changes to hypertension management plan are necessary at this time.    4. Hyperlipidemia.  - Currently on atorvastatin 20 mg daily for cholesterol management. No changes to hyperlipidemia management plan are necessary at this time.  Repeat lipid panel will be performed with her next visit.    5.  Microalbuminuria.  She will continue the current dose of lisinopril 10 mg  daily.  A urine microalbumin to creatinine ratio will be performed with her next visit.    Follow-up: Next visit in approximately 4 months with preceding hemoglobin A1c, CMP, CBC, TSH, lipid panel, and urine microalbumin to creatinine ratio.      Pertinent Medical History  Derek Arango  is a 50-year-old female with a history of type 2 diabetes, obesity, hypertension, and hyperlipidemia.    Her diabetes was diagnosed about 4 to 5 years ago. She was initially placed on metformin, Jardiance, and glimepiride. Rybelsus was tried but found to be ineffective, leading to the initiation of glimepiride. In February 2025, she was switched from glimepiride to Mounjaro.    Diabetes complications include microalbuminuria. She reports no neuropathy, retinopathy, heart attack, stroke, or claudication.        History of Present Illness  History of Present Illness  Derek Arango  is a 50-year-old female with a history of type 2 diabetes, obesity, hypertension, and hyperlipidemia, here for a follow-up visit.    Her diabetes was diagnosed about 4 to 5 years ago. She was initially placed on metformin, Jardiance, and glimepiride. Rybelsus was tried but found to be ineffective, leading to the initiation of glimepiride. In February 2025, she was switched from glimepiride to Mounjaro.     She reports mild nausea and reflux but no significant stomach issues or abdominal pain. She has lost approximately 20 pounds since her last visit. She does not experience frequent urination or nighttime urination. She occasionally feels very thirsty or hungry. She typically takes Mounjaro on Sundays, which suppresses her appetite until Thursday, but by Friday, she feels extremely hungry.     She reports no blurry vision, numbness, tingling in her feet, or foot wounds. She experienced digestive issues after consuming something unusual during her vacation and took Tums to alleviate the symptoms. She is currently on Jardiance 10 mg daily, metformin  "1000 mg twice daily, and Mounjaro 7.5 mg once weekly. Glimepiride has been discontinued.     Diabetes complications include microalbuminuria. She reports no neuropathy, retinopathy, heart attack, stroke, or claudication.    She is on atorvastatin 20 mg daily for cholesterol management.    She is on hydrochlorothiazide 25 mg daily and lisinopril 10 mg daily for blood pressure control and kidney protection from diabetes. She reports no chest pain or shortness of breath.    Home blood glucometer readings:   She monitors her blood sugar levels during the week, usually first thing in the morning, which range from the 130s to 150s. During a recent vacation, when she was not monitoring her diet, her blood sugar levels were in the 120s. She has never experienced low blood sugar levels.     Current diabetic regimen:   He takes Jardiance 10 mg daily, metformin 1000 mg twice daily, and Mounjaro 7.5 mg once a week for diabetes management.    She takes atorvastatin 20 mg daily for hyperlipidemia.    She takes hydrochlorothiazide 25 mg daily and lisinopril 10 mg daily for hypertension.      Review of Systems as per HPI    Medical History Reviewed by provider this encounter:  Tobacco  Allergies  Meds  Problems  Med Hx  Surg Hx  Fam Hx     .  Medications Ordered Prior to Encounter[1]   Social History[2]     Medical History Reviewed by provider this encounter:  Tobacco  Allergies  Meds  Problems  Med Hx  Surg Hx  Fam Hx     .    Objective  /78   Pulse 82   Ht 5' 5\" (1.651 m)   Wt (!) 143 kg (315 lb)   BMI 52.42 kg/m²      Body mass index is 52.42 kg/m².  Wt Readings from Last 3 Encounters:   07/15/25 (!) 143 kg (315 lb)   07/09/25 (!) 141 kg (310 lb)   05/09/25 (!) 146 kg (322 lb 9.6 oz)     Physical Exam    Physical Exam  Vitals and nursing note reviewed.   Constitutional:       General: She is not in acute distress.     Appearance: Normal appearance. She is well-developed. She is obese.   HENT:      Head: " Normocephalic and atraumatic.     Eyes:      Conjunctiva/sclera: Conjunctivae normal.     Neck:      Vascular: No carotid bruit.      Comments: Thyroid normal in size.  No palpable thyroid nodules.  Cardiovascular:      Rate and Rhythm: Normal rate and regular rhythm.      Heart sounds: Normal heart sounds. No murmur heard.  Pulmonary:      Effort: Pulmonary effort is normal. No respiratory distress.      Breath sounds: Normal breath sounds. No wheezing.   Abdominal:      Palpations: Abdomen is soft.     Musculoskeletal:         General: No swelling.      Cervical back: Neck supple.      Right lower leg: No edema.      Left lower leg: No edema.   Lymphadenopathy:      Cervical: No cervical adenopathy.     Skin:     General: Skin is warm and dry.     Neurological:      Mental Status: She is alert and oriented to person, place, and time.     Psychiatric:         Mood and Affect: Mood normal.       Last Eye Exam: Not on file  Last Foot Exam: 10/03/2024  Health Maintenance   Topic Date Due   • Diabetic Eye Exam  Never done   • Diabetic Foot Exam  10/03/2025       Results    Labs: I have reviewed pertinent labs including:   Lab Results   Component Value Date    HGBA1C 5.9 07/15/2025    HGBA1C 5.9 02/06/2025    HGBA1C 6.6 (H) 10/09/2024      Lab Results   Component Value Date    CREATININE 0.77 01/29/2025    CREATININE 0.67 10/09/2024    CREATININE 0.64 06/05/2024    BUN 22 01/29/2025    K 4.4 01/29/2025     01/29/2025    CO2 23 01/29/2025      GFR, Calculated   Date Value Ref Range Status   06/05/2024 108 > OR = 60 mL/min/1.73m2 Final     eGFR   Date Value Ref Range Status   01/29/2025 93 > OR = 60 mL/min/1.73m2 Final      HDL   Date Value Ref Range Status   10/09/2024 51 > OR = 50 mg/dL Final     Triglycerides   Date Value Ref Range Status   10/09/2024 123 <150 mg/dL Final      ALT   Date Value Ref Range Status   01/29/2025 20 6 - 29 U/L Final     ALANINE AMINOTRANSFERASE   Date Value Ref Range Status   06/05/2024  23 6 - 29 U/L Final     AST   Date Value Ref Range Status   01/29/2025 12 10 - 35 U/L Final   06/05/2024 12 10 - 35 U/L Final     Alkaline Phosphatase   Date Value Ref Range Status   01/29/2025 67 37 - 153 U/L Final   06/05/2024 64 37 - 153 U/L Final           Patient Instructions   Hgba1c is 5.9%. this is excellent.     We'll try increasing the mounjaro to 10 mg once a week. Hopefully we can then decrease the metformin to 500 mg twice a day.     For now, no change in jardiance or metformin.     Continue to test blood sugars once a day.    Follow up in 4 months with blood work.     Discussed with the patient and all questioned fully answered. She will call me if any problems arise.           [1]   Current Outpatient Medications on File Prior to Visit   Medication Sig Dispense Refill   • aspirin (ECOTRIN LOW STRENGTH) 81 mg EC tablet Take by mouth in the morning.     • atorvastatin (LIPITOR) 20 mg tablet Take 1 tablet (20 mg total) by mouth every evening 90 tablet 1   • Cyanocobalamin 1000 MCG CAPS Take by mouth in the morning.     • Empagliflozin (JARDIANCE) 10 MG TABS tablet Take 1 tablet (10 mg total) by mouth daily 90 tablet 3   • Ferrous Sulfate (IRON PO) Take by mouth in the morning     • fexofenadine (ALLEGRA) 180 MG tablet Take by mouth in the morning.     • hydroCHLOROthiazide 25 mg tablet Take 1 tablet (25 mg total) by mouth daily 90 tablet 1   • lisinopril (ZESTRIL) 10 mg tablet Take 1 tablet (10 mg total) by mouth daily 90 tablet 1   • MAGNESIUM PO Take by mouth in the morning     • metFORMIN (GLUCOPHAGE) 1000 MG tablet Take 1 tablet (1,000 mg total) by mouth 2 (two) times a day with meals 180 tablet 3   • Multiple Vitamin (MULTIVITAMIN ADULT PO) Take by mouth in the morning     • Tirzepatide (Mounjaro) 7.5 MG/0.5ML SOAJ Inject 7.5 mg once a week 6 mL 3   • Turmeric 500 MG CAPS Take by mouth in the morning       No current facility-administered medications on file prior to visit.   [2]   Social  History  Tobacco Use   • Smoking status: Never   • Smokeless tobacco: Never   Vaping Use   • Vaping status: Never Used   Substance and Sexual Activity   • Alcohol use: Yes     Alcohol/week: 3.0 standard drinks of alcohol     Types: 1 Glasses of wine, 1 Shots of liquor, 1 Standard drinks or equivalent per week     Comment: socially a few time a month   • Drug use: Never   • Sexual activity: Yes     Partners: Female     Birth control/protection: None

## 2025-07-27 DIAGNOSIS — I10 PRIMARY HYPERTENSION: ICD-10-CM

## 2025-07-29 RX ORDER — HYDROCHLOROTHIAZIDE 25 MG/1
25 TABLET ORAL DAILY
Qty: 90 TABLET | Refills: 1 | Status: SHIPPED | OUTPATIENT
Start: 2025-07-29

## 2025-07-29 RX ORDER — LISINOPRIL 10 MG/1
10 TABLET ORAL DAILY
Qty: 90 TABLET | Refills: 1 | Status: SHIPPED | OUTPATIENT
Start: 2025-07-29